# Patient Record
Sex: FEMALE
[De-identification: names, ages, dates, MRNs, and addresses within clinical notes are randomized per-mention and may not be internally consistent; named-entity substitution may affect disease eponyms.]

---

## 2022-06-11 ENCOUNTER — NON-APPOINTMENT (OUTPATIENT)
Age: 57
End: 2022-06-11

## 2023-08-11 ENCOUNTER — INPATIENT (INPATIENT)
Facility: HOSPITAL | Age: 58
LOS: 12 days | Discharge: HOME | DRG: 885 | End: 2023-08-24
Attending: PSYCHIATRY & NEUROLOGY | Admitting: PSYCHIATRY & NEUROLOGY
Payer: COMMERCIAL

## 2023-08-11 VITALS
RESPIRATION RATE: 16 BRPM | SYSTOLIC BLOOD PRESSURE: 125 MMHG | TEMPERATURE: 98 F | HEIGHT: 68 IN | OXYGEN SATURATION: 97 % | HEART RATE: 107 BPM | WEIGHT: 125 LBS | DIASTOLIC BLOOD PRESSURE: 89 MMHG

## 2023-08-11 DIAGNOSIS — F29 UNSPECIFIED PSYCHOSIS NOT DUE TO A SUBSTANCE OR KNOWN PHYSIOLOGICAL CONDITION: ICD-10-CM

## 2023-08-11 LAB
ANION GAP SERPL CALC-SCNC: 12 MMOL/L — SIGNIFICANT CHANGE UP (ref 5–17)
APAP SERPL-MCNC: < 2 UG/ML (ref 10–30)
APPEARANCE UR: CLEAR — SIGNIFICANT CHANGE UP
BASOPHILS # BLD AUTO: 0.02 K/UL — SIGNIFICANT CHANGE UP (ref 0–0.2)
BASOPHILS NFR BLD AUTO: 0.2 % — SIGNIFICANT CHANGE UP (ref 0–2)
BILIRUB UR-MCNC: NEGATIVE — SIGNIFICANT CHANGE UP
BUN SERPL-MCNC: 15 MG/DL — SIGNIFICANT CHANGE UP (ref 7–23)
CALCIUM SERPL-MCNC: 9.7 MG/DL — SIGNIFICANT CHANGE UP (ref 8.5–10.1)
CHLORIDE SERPL-SCNC: 107 MMOL/L — SIGNIFICANT CHANGE UP (ref 96–108)
CO2 SERPL-SCNC: 20 MMOL/L — LOW (ref 22–31)
COLOR SPEC: YELLOW — SIGNIFICANT CHANGE UP
CREAT SERPL-MCNC: 0.9 MG/DL — SIGNIFICANT CHANGE UP (ref 0.5–1.3)
DIFF PNL FLD: ABNORMAL
EGFR: 73 ML/MIN/1.73M2 — SIGNIFICANT CHANGE UP
EOSINOPHIL # BLD AUTO: 0 K/UL — SIGNIFICANT CHANGE UP (ref 0–0.5)
EOSINOPHIL NFR BLD AUTO: 0 % — SIGNIFICANT CHANGE UP (ref 0–6)
ETHANOL SERPL-MCNC: <10 MG/DL — SIGNIFICANT CHANGE UP (ref 0–10)
GLUCOSE SERPL-MCNC: 184 MG/DL — HIGH (ref 70–99)
GLUCOSE UR QL: 1000 MG/DL
HCT VFR BLD CALC: 43.5 % — SIGNIFICANT CHANGE UP (ref 34.5–45)
HGB BLD-MCNC: 15 G/DL — SIGNIFICANT CHANGE UP (ref 11.5–15.5)
IMM GRANULOCYTES NFR BLD AUTO: 0.3 % — SIGNIFICANT CHANGE UP (ref 0–0.9)
KETONES UR-MCNC: ABNORMAL
LEUKOCYTE ESTERASE UR-ACNC: NEGATIVE — SIGNIFICANT CHANGE UP
LYMPHOCYTES # BLD AUTO: 0.75 K/UL — LOW (ref 1–3.3)
LYMPHOCYTES # BLD AUTO: 8.5 % — LOW (ref 13–44)
MCHC RBC-ENTMCNC: 28.7 PG — SIGNIFICANT CHANGE UP (ref 27–34)
MCHC RBC-ENTMCNC: 34.5 GM/DL — SIGNIFICANT CHANGE UP (ref 32–36)
MCV RBC AUTO: 83.3 FL — SIGNIFICANT CHANGE UP (ref 80–100)
MONOCYTES # BLD AUTO: 0.37 K/UL — SIGNIFICANT CHANGE UP (ref 0–0.9)
MONOCYTES NFR BLD AUTO: 4.2 % — SIGNIFICANT CHANGE UP (ref 2–14)
NEUTROPHILS # BLD AUTO: 7.69 K/UL — HIGH (ref 1.8–7.4)
NEUTROPHILS NFR BLD AUTO: 86.8 % — HIGH (ref 43–77)
NITRITE UR-MCNC: NEGATIVE — SIGNIFICANT CHANGE UP
PCP SPEC-MCNC: SIGNIFICANT CHANGE UP
PH UR: 5 — SIGNIFICANT CHANGE UP (ref 5–8)
PLATELET # BLD AUTO: 279 K/UL — SIGNIFICANT CHANGE UP (ref 150–400)
POTASSIUM SERPL-MCNC: 3.6 MMOL/L — SIGNIFICANT CHANGE UP (ref 3.5–5.3)
POTASSIUM SERPL-SCNC: 3.6 MMOL/L — SIGNIFICANT CHANGE UP (ref 3.5–5.3)
PROT UR-MCNC: 30 MG/DL
RBC # BLD: 5.22 M/UL — HIGH (ref 3.8–5.2)
RBC # FLD: 12.5 % — SIGNIFICANT CHANGE UP (ref 10.3–14.5)
SALICYLATES SERPL-MCNC: <1.7 MG/DL — LOW (ref 2.8–20)
SARS-COV-2 RNA SPEC QL NAA+PROBE: SIGNIFICANT CHANGE UP
SODIUM SERPL-SCNC: 139 MMOL/L — SIGNIFICANT CHANGE UP (ref 135–145)
SP GR SPEC: 1.02 — SIGNIFICANT CHANGE UP (ref 1.01–1.02)
UROBILINOGEN FLD QL: NEGATIVE — SIGNIFICANT CHANGE UP
WBC # BLD: 8.86 K/UL — SIGNIFICANT CHANGE UP (ref 3.8–10.5)
WBC # FLD AUTO: 8.86 K/UL — SIGNIFICANT CHANGE UP (ref 3.8–10.5)

## 2023-08-11 PROCEDURE — 93010 ELECTROCARDIOGRAM REPORT: CPT

## 2023-08-11 PROCEDURE — 70450 CT HEAD/BRAIN W/O DYE: CPT | Mod: MH

## 2023-08-11 PROCEDURE — 80061 LIPID PANEL: CPT

## 2023-08-11 PROCEDURE — 82962 GLUCOSE BLOOD TEST: CPT

## 2023-08-11 PROCEDURE — 84443 ASSAY THYROID STIM HORMONE: CPT

## 2023-08-11 PROCEDURE — 83036 HEMOGLOBIN GLYCOSYLATED A1C: CPT

## 2023-08-11 PROCEDURE — 80164 ASSAY DIPROPYLACETIC ACD TOT: CPT

## 2023-08-11 PROCEDURE — 86803 HEPATITIS C AB TEST: CPT

## 2023-08-11 PROCEDURE — 36415 COLL VENOUS BLD VENIPUNCTURE: CPT

## 2023-08-11 PROCEDURE — 80307 DRUG TEST PRSMV CHEM ANLYZR: CPT

## 2023-08-11 PROCEDURE — 81001 URINALYSIS AUTO W/SCOPE: CPT

## 2023-08-11 PROCEDURE — 85025 COMPLETE CBC W/AUTO DIFF WBC: CPT

## 2023-08-11 PROCEDURE — 70450 CT HEAD/BRAIN W/O DYE: CPT | Mod: 26,MH

## 2023-08-11 PROCEDURE — 99285 EMERGENCY DEPT VISIT HI MDM: CPT

## 2023-08-11 RX ORDER — METFORMIN HYDROCHLORIDE 850 MG/1
4 TABLET ORAL
Refills: 0 | DISCHARGE

## 2023-08-11 RX ORDER — DIPHENHYDRAMINE HCL 50 MG
50 CAPSULE ORAL EVERY 6 HOURS
Refills: 0 | Status: DISCONTINUED | OUTPATIENT
Start: 2023-08-11 | End: 2023-08-24

## 2023-08-11 RX ORDER — HALOPERIDOL DECANOATE 100 MG/ML
5 INJECTION INTRAMUSCULAR EVERY 6 HOURS
Refills: 0 | Status: DISCONTINUED | OUTPATIENT
Start: 2023-08-11 | End: 2023-08-24

## 2023-08-11 RX ORDER — EMPAGLIFLOZIN 10 MG/1
1 TABLET, FILM COATED ORAL
Refills: 0 | DISCHARGE

## 2023-08-11 RX ORDER — TRAZODONE HCL 50 MG
75 TABLET ORAL AT BEDTIME
Refills: 0 | Status: DISCONTINUED | OUTPATIENT
Start: 2023-08-11 | End: 2023-08-19

## 2023-08-11 RX ORDER — RISPERIDONE 4 MG/1
0.5 TABLET ORAL ONCE
Refills: 0 | Status: COMPLETED | OUTPATIENT
Start: 2023-08-11 | End: 2023-08-11

## 2023-08-11 RX ORDER — DIPHENHYDRAMINE HCL 50 MG
50 CAPSULE ORAL ONCE
Refills: 0 | Status: DISCONTINUED | OUTPATIENT
Start: 2023-08-11 | End: 2023-08-24

## 2023-08-11 RX ORDER — HALOPERIDOL DECANOATE 100 MG/ML
5 INJECTION INTRAMUSCULAR ONCE
Refills: 0 | Status: DISCONTINUED | OUTPATIENT
Start: 2023-08-11 | End: 2023-08-24

## 2023-08-11 RX ORDER — RISPERIDONE 4 MG/1
0.5 TABLET ORAL
Refills: 0 | Status: DISCONTINUED | OUTPATIENT
Start: 2023-08-11 | End: 2023-08-13

## 2023-08-11 RX ADMIN — RISPERIDONE 0.5 MILLIGRAM(S): 4 TABLET ORAL at 21:43

## 2023-08-11 RX ADMIN — RISPERIDONE 0.5 MILLIGRAM(S): 4 TABLET ORAL at 16:00

## 2023-08-11 RX ADMIN — Medication 75 MILLIGRAM(S): at 21:45

## 2023-08-11 RX ADMIN — Medication 2 MILLIGRAM(S): at 16:53

## 2023-08-11 NOTE — BH PATIENT PROFILE - FALL HARM RISK - UNIVERSAL INTERVENTIONS
Call bell, personal items and telephone in reach/Non-slip footwear when patient is out of bed/[place] to call system/Physically safe environment - no spills, clutter or unnecessary equipment/Purposeful Proactive Rounding/Room/bathroom lighting operational, light cord in reach

## 2023-08-11 NOTE — ED ADULT NURSE NOTE - CHIEF COMPLAINT QUOTE
Pt. is A&Ox3, BIBEMS s/p hanging. EMS reports pt was in car with a shoe string around her neck. When bystander saw her she went into the woods where PD found her. Had a manic episode in July. Pt. reports putting something around her neck, almost passed out. Pt states "I just wanted to end it." 1:1 initiated. Patient has a plan of going to the train station.   [place]   Trauma alert called at 1245pm. Redness noted around patient's neck. Denies CP, SOB. Taken for EKG and cardiac monitor.

## 2023-08-11 NOTE — ED ADULT NURSE NOTE - NSFALLUNIVINTERV_ED_ALL_ED
Bed/Stretcher in lowest position, wheels locked, appropriate side rails in place/Call bell, personal items and telephone in reach/Instruct patient to call for assistance before getting out of bed/chair/stretcher/Non-slip footwear applied when patient is off stretcher/[place] to call system/Physically safe environment - no spills, clutter or unnecessary equipment/Purposeful proactive rounding/Room/bathroom lighting operational, light cord in reach

## 2023-08-11 NOTE — ED BEHAVIORAL HEALTH ASSESSMENT NOTE - NSSUICPROTFACT_PSY_ALL_CORE
Responsibility to children, family, or others/Identifies reasons for living/Supportive social network of family or friends/Cultural, spiritual and/or moral attitudes against suicide/Positive therapeutic relationships/Hindu beliefs

## 2023-08-11 NOTE — ED ADULT TRIAGE NOTE - CHIEF COMPLAINT QUOTE
Pt. is A&Ox3, BIBEMS s/p hanging. EMS reports pt was in car with a shoe string around her neck. When bystander saw her she went into the woods where PD found her. Had a manic episode in July. Pt. reports putting something around her neck, almost passed out. Pt states "I just wanted to end it." 1:1 initiated. Patient has a plan of going to the train station.   [place]   Trauma alert called at 1245pm. Redness noted around patient's neck. Denies CP, SOB. Taken for EKG and cardiac monitor.

## 2023-08-11 NOTE — ED BEHAVIORAL HEALTH ASSESSMENT NOTE - DESCRIPTION
See HPI Vital Signs Last 24 Hrs  T(C): 36.8 (11 Aug 2023 12:41), Max: 36.8 (11 Aug 2023 12:41)  T(F): 98.2 (11 Aug 2023 12:41), Max: 98.2 (11 Aug 2023 12:41)  HR: 107 (11 Aug 2023 12:41) (107 - 107)  BP: 125/89 (11 Aug 2023 12:41) (125/89 - 125/89)  BP(mean): --  RR: 16 (11 Aug 2023 12:41) (16 - 16)  SpO2: 97% (11 Aug 2023 12:41) (97% - 97%)    Parameters below as of 11 Aug 2023 12:41  Patient On (Oxygen Delivery Method): room air see hospitalist note

## 2023-08-11 NOTE — ED BEHAVIORAL HEALTH ASSESSMENT NOTE - RISK ASSESSMENT
HIGH RISK    ACUTE RISK FACTORS: Suicide attempt, Depression    CHRONIC RISK FACTORS: Depression, Hx of inpatient hospitalizations    PROTECTIVE FACTORS: No violence history, medication compliance, no access to guns, no global insomnia, no substance abuse, supportive family, willingness to seek help, no suicidal ideation or homicidal ideation, hopefulness for future.

## 2023-08-11 NOTE — ED BEHAVIORAL HEALTH ASSESSMENT NOTE - DETAILS
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 Admit 9.39

## 2023-08-11 NOTE — ED BEHAVIORAL HEALTH ASSESSMENT NOTE - SUMMARY
Patient is a 58 year old female, single, never , no decedents, domiciled with mother in private home, was employed as a  at [employer] [place] by [employer] but recently resigned due to "gayla and decline in cognition." No EtOH use. No drug use. PPHx of Depression, 1 prior hospitalization at [facility] a few years ago for depression. Recently in treatment with Dr. Mckeon, on Lexapro and Ativan for depression and anxiety, although as per patient she says she is Bipolar, had a manic episode in July but is not able to describe details of those events. Denies familial Hx of any mental illness. BIBA after interrupted SA via trying to hang self. Psychiatry consulted for SA.    Patient presents with psychosis and s/p SA. Patient is unpredictable and at risk for impulsive behavior due to her thought disorder. Patient is acutely psychotic and unable to function and is an imminent risk to self as he is care for self due to the severity of symptoms. Patient therefore requires inpatient admission for safety and stabilization. Patient is a 58 year old female, single, never , no dependents, domiciled with mother in private home, was employed as a  at [employer] but recently resigned due to "gayla and decline in cognition." No EtOH use. No drug use. PPHx of Depression, 1 prior hospitalization at [facility] a few years ago for depression. Recently in treatment with Dr. Mckeon, on Lexapro and Ativan for depression and anxiety, although as per patient she says she is Bipolar, had a manic episode in July but is not able to describe details of those events. Denies familial Hx of any mental illness. BIBA after interrupted SA via trying to hang self. Psychiatry consulted for SA.    Patient presents with psychosis and s/p SA. Patient is unpredictable and at risk for impulsive behavior due to her thought disorder. Patient is acutely psychotic and unable to function and is an imminent risk to self as he is care for self due to the severity of symptoms. Patient therefore requires inpatient admission for safety and stabilization.

## 2023-08-11 NOTE — ED BEHAVIORAL HEALTH ASSESSMENT NOTE - PATIENT'S CHIEF COMPLAINT
"I am going to go to [facility], I will be in this hospital for the rest of my life and I will never be able to atone for attempting suicide."

## 2023-08-11 NOTE — ED BEHAVIORAL HEALTH ASSESSMENT NOTE - NSBHATTESTAPPBILLTIME_PSY_A_CORE
I attest my time as BARRY is greater than 50% of the total combined time spent on qualifying patient care activities. I have reviewed and verified the documentation.

## 2023-08-11 NOTE — ED BEHAVIORAL HEALTH ASSESSMENT NOTE - HPI (INCLUDE ILLNESS QUALITY, SEVERITY, DURATION, TIMING, CONTEXT, MODIFYING FACTORS, ASSOCIATED SIGNS AND SYMPTOMS)
Patient is a 58 year old female, single, never , no decedents, domiciled with mother in private home, was employed as a  at [employer] but recently resigned due to "gayla and decline in cognition." No EtOH use. No drug use. PPHx of Depression, 1 prior hospitalization at [facility] a few years ago for depression. Recently in treatment with Dr. Mckeon, on Lexapro and Ativan for depression and anxiety, although as per patient she says she is Bipolar, had a manic episode in July but is not able to describe details of those events. Denies familial Hx of any mental illness. BIBA after interrupted SA via trying to hang self. Psychiatry consulted for SA.    Patient presents highly anxious, perseverative, ruminative and with irrational thought process. She reports she is Bipolar but has never been diagnosed, but did all the research and that she was "manic" in July and had to quit her job, stating she couldn't sleep and she was engaging in risky behaviors such as driving fast without a seatbelt. She also reports she also had visual hallucinations of where she used to work at [employer] but could not elaborate further, denies auditory hallucinations or other visual hallucinations. She remains highly fixated that her family will "never forgive her" for attempting suicide because she is [employer] and that she will spend the rest of her life in a psychiatric hospital and never be able to atone for her sin of attempting suicide. Reports this morning she researched [facility] times and planned to jump in front of the 11:34 AM train but missed it, she then ran to a tree and tried to hang self with shoestrings, visible red ligature marks to neck. She reports she planned SA this morning because, "I had a traumatic memory from 34 years ago when I was emotionally and verbally abusive to my sister so I decided to end it." She would not disclose more details, denies she was physically or sexually abusive to her sister of anyone else, denies any hx of verbal/physical/sexual abuse to her.     Collateral from sister, Hortensia: Reports patient has not been acting herself for a few weeks, she brought her to [facility] on Tuesday night because she was "acting different" but was T&R. She reports her sister has been having poor sleep and not feeling herself fo the past few days. She has only been in treatment for a few weeks for depression and anxiety however reports her sister keeps stating she is Bipolar and having manic and psychotic episodes but does not know the exact context of her behaviors.    Collateral from Dr. Mckeon, Psychiatrist: Reports he recently started treating patient for depression and anxiety, last saw her yesterday 8/10/23 and patient presented "fine and was normal." Suggested to obtain additional collateral from family regarding patients change in behavior. Patient is a 58 year old female, single, never , no dependents, domiciled with mother in private home, was employed as a  at [employer] but recently resigned due to "gayla and decline in cognition." No EtOH use. No drug use. PPHx of Depression, 1 prior hospitalization at [facility] a few years ago for depression. Recently in treatment with Dr. Mckeon, on Lexapro and Ativan for depression and anxiety, although as per patient she says she is Bipolar, had a manic episode in July but is not able to describe details of those events. Denies familial Hx of any mental illness. BIBA after interrupted SA via trying to hang self. Psychiatry consulted for SA.    Patient presents highly anxious, perseverative, ruminative and with irrational thought process. She reports she is Bipolar but has never been diagnosed, but did all the research and that she was "manic" in July and had to quit her job, stating she couldn't sleep and she was engaging in risky behaviors such as driving fast without a seatbelt. She also reports she also had visual hallucinations of where she used to work at [employer] but could not elaborate further, denies auditory hallucinations or other visual hallucinations. She remains highly fixated that her family will "never forgive her" for attempting suicide because she is [employer] and that she will spend the rest of her life in a psychiatric hospital and never be able to atone for her sin of attempting suicide. Reports this morning she researched [facility] times and planned to jump in front of the 11:34 AM train but missed it, she then ran to a tree and tried to hang self with shoestrings, visible red ligature marks to neck. She reports she planned SA this morning because, "I had a traumatic memory from 34 years ago when I was emotionally and verbally abusive to my sister so I decided to end it." She would not disclose more details, denies she was physically or sexually abusive to her sister of anyone else, denies any hx of verbal/physical/sexual abuse to her.     Collateral from sister, Hortensia: Reports patient has not been acting herself for a few weeks, she brought her to [facility] on Tuesday night because she was "acting different" but was T&R. She reports her sister has been having poor sleep and not feeling herself fo the past few days. She has only been in treatment for a few weeks for depression and anxiety however reports her sister keeps stating she is Bipolar and having manic and psychotic episodes but does not know the exact context of her behaviors.    Collateral from Dr. Mckeon, Psychiatrist: Reports he recently started treating patient for depression and anxiety, last saw her yesterday 8/10/23 and patient presented "fine and was normal." Suggested to obtain additional collateral from family regarding patients change in behavior.

## 2023-08-11 NOTE — ED ADULT NURSE NOTE - OBJECTIVE STATEMENT
Patient s/p attempted hanging. Has ligature marks around neck. Patient feels like she has no support from family and that they won't even visit her. Patient states that she is going to hell because she tried to kill herself. Says she has history of bipolar. Has never tried to hurt herself like this before. Patient denies SOB, CP. Has some neck pain. able to speak in full sentences, but speech is quiet. Patient denies wanting to hurt anyone else or hallucinations.

## 2023-08-11 NOTE — ED PROVIDER NOTE - OBJECTIVE STATEMENT
57 y/o female with PMHx of T2DM on metformin BIBEMS to the ED s/p suicidal attempt. Per EMS, pt was in her car and tightly wounding a shoestring around her neck when she was seen by a bystander, then ran into the woods where she was found by PD. Pt states she was attempting to end her life, that due to issues with her family she does not want to be here anymore. Pt with no diagnosed psychiatric conditions, however states she has been having "psychotic" episodes recently and a manic episode in July. Pt endorses red marks circumferentially to her neck, denies any tenderness or difficulty breathing. Denies hallucinations.

## 2023-08-11 NOTE — ED PROVIDER NOTE - CLINICAL SUMMARY MEDICAL DECISION MAKING FREE TEXT BOX
Patient presents to the ED for suicide attempt.  Attempted to strangle herself with shoelace.  Patient with marks on her neck from shoelace.  Talking well, no increased secretions, no hoarse voice.  Airway intact.  Psychiatry consulted and requires psychiatric admission.  Patient medically cleared although psych requesting CT scan of the head prior to admission.  Patient signed out to oncoming provider pending CT of head.

## 2023-08-11 NOTE — ED BEHAVIORAL HEALTH ASSESSMENT NOTE - OTHER
Was a  at [employer], but resigned because she reported "cognitive decline" Dr. Em

## 2023-08-11 NOTE — BH PATIENT PROFILE - HOME MEDICATIONS
atorvastatin 10 mg oral tablet , 1 tab(s) orally once a day  metFORMIN 500 mg oral tablet, extended release , 4 tab(s) orally once a day  Jardiance 25 mg oral tablet , 1 tab(s) orally once a day  LORazepam 1 mg oral tablet , 1 tab(s) orally 2 times a day as needed for  escitalopram 5 mg oral tablet , 1.5 tab(s) orally once a day

## 2023-08-12 DIAGNOSIS — F34.1 DYSTHYMIC DISORDER: ICD-10-CM

## 2023-08-12 LAB
A1C WITH ESTIMATED AVERAGE GLUCOSE RESULT: 6.7 % — HIGH (ref 4–5.6)
CHOLEST SERPL-MCNC: 137 MG/DL — SIGNIFICANT CHANGE UP
ESTIMATED AVERAGE GLUCOSE: 146 MG/DL — HIGH (ref 68–114)
GLUCOSE BLDC GLUCOMTR-MCNC: 196 MG/DL — HIGH (ref 70–99)
HDLC SERPL-MCNC: 69 MG/DL — SIGNIFICANT CHANGE UP
LIPID PNL WITH DIRECT LDL SERPL: 50 MG/DL — SIGNIFICANT CHANGE UP
NON HDL CHOLESTEROL: 68 MG/DL — SIGNIFICANT CHANGE UP
TRIGL SERPL-MCNC: 98 MG/DL — SIGNIFICANT CHANGE UP
TSH SERPL-MCNC: 0.82 UU/ML — SIGNIFICANT CHANGE UP (ref 0.34–4.82)

## 2023-08-12 PROCEDURE — 99223 1ST HOSP IP/OBS HIGH 75: CPT

## 2023-08-12 PROCEDURE — 99253 IP/OBS CNSLTJ NEW/EST LOW 45: CPT

## 2023-08-12 RX ORDER — ATORVASTATIN CALCIUM 80 MG/1
10 TABLET, FILM COATED ORAL AT BEDTIME
Refills: 0 | Status: DISCONTINUED | OUTPATIENT
Start: 2023-08-12 | End: 2023-08-24

## 2023-08-12 RX ORDER — METFORMIN HYDROCHLORIDE 850 MG/1
1000 TABLET ORAL
Refills: 0 | Status: DISCONTINUED | OUTPATIENT
Start: 2023-08-12 | End: 2023-08-24

## 2023-08-12 RX ADMIN — METFORMIN HYDROCHLORIDE 1000 MILLIGRAM(S): 850 TABLET ORAL at 21:49

## 2023-08-12 RX ADMIN — RISPERIDONE 0.5 MILLIGRAM(S): 4 TABLET ORAL at 09:11

## 2023-08-12 RX ADMIN — ATORVASTATIN CALCIUM 10 MILLIGRAM(S): 80 TABLET, FILM COATED ORAL at 21:49

## 2023-08-12 RX ADMIN — RISPERIDONE 0.5 MILLIGRAM(S): 4 TABLET ORAL at 21:49

## 2023-08-12 RX ADMIN — Medication 75 MILLIGRAM(S): at 22:26

## 2023-08-12 NOTE — BH INPATIENT PSYCHIATRY ASSESSMENT NOTE - RISK ASSESSMENT
HIGH RISK    ACUTE RISK FACTORS: Suicide attempt, Depression    CHRONIC RISK FACTORS: Depression, Hx of inpatient hospitalizations    PROTECTIVE FACTORS: No violence history, medication compliance, no access to guns, no global insomnia, no substance abuse, supportive family, willingness to seek help, no suicidal ideation or homicidal ideation, hopefulness for future. moderate risk as the pt is remorseful     ACUTE RISK FACTORS: Suicide attempt, Depression    CHRONIC RISK FACTORS: Depression, Hx of inpatient hospitalizations    PROTECTIVE FACTORS: No violence history, medication compliance, no access to guns, no global insomnia, no substance abuse, supportive family, willingness to seek help, no suicidal ideation or homicidal ideation, hopefulness for future.  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  Mitigating factors- the pt is currently in the safe setting of an inpatient hospital unit and pt is amenable  to ongoing treatment of her severe mood disorder with thought disorganization , pt amenable to ongoing outpatient follow up treatment, pt able to form therapeutic alliances with select staff

## 2023-08-12 NOTE — BH INPATIENT PSYCHIATRY ASSESSMENT NOTE - NSTXSUICIDINTERMD_PSY_ALL_CORE
1. Informed consent obtained from the pt for a trial of Risperdal now titrated to 2 mg po qhs to decrease daytime sedation  2.Ativan 1 mg po tid standing dose to alleviate severe and functionally impairing anxiety in the context of severe affective psychosis  3.Ongoing staff and pt family support and encouragement  4.Pt gave writer consent to speak with pt's family and with pt's outpatient Dr Mckeon in [place] to gather further pt clinical history to aid in pt treatment and disposition planning  5.Safety planning ongoing  6. To consider restart of SSRI Lexapro once the pt's anxiety and thought disorganization have begun to resolve

## 2023-08-12 NOTE — BH SOCIAL WORK INITIAL PSYCHOSOCIAL EVALUATION - NSBHEMPLOYEDYN_PSY_ALL_CORE
Per EMR, "was employed as a  at [employer] but recently resigned due to "gayla and decline in cognition."/No Per EMR, "was employed as a  at [employer] but recently resigned due to "gayla and decline in cognition."  Pt confirms that she submitted her resignation letter prior to admission due to her "gayla" and "knowing that she would not be able to continue to work with her current symptoms"./No

## 2023-08-12 NOTE — BH SOCIAL WORK INITIAL PSYCHOSOCIAL EVALUATION - NSBHSUPPORTCOMMENT_PSY_ALL_CORE
Pt gives consent for SW to contact her sister Hortensia @ [phone] but asked for SW to not call today 8/12 due to sister being busy

## 2023-08-12 NOTE — H&P ADULT - HISTORY OF PRESENT ILLNESS
pt is 57 yo female with PMH of type 2 DM, HL, depression, anxiety, bipolar here after having a suicide attempt. pt states she recently resigned from being  due to manic episodes.  as per chart pt was found with shoelace around neck trying to harm herself and brought to the ED.  pt currently denies any cp, sob, neck pain, dizziness, n/v/d.    PMH - as above  PSH - none  FH - mother with dementia in her 80s, father  in  his 70s of lung CA  SH - denies tob, used to drink but denies now, drugs.  lives with mother.  allergies - none

## 2023-08-12 NOTE — BH SOCIAL WORK INITIAL PSYCHOSOCIAL EVALUATION - OTHER PAST PSYCHIATRIC HISTORY (INCLUDE DETAILS REGARDING ONSET, COURSE OF ILLNESS, INPATIENT/OUTPATIENT TREATMENT)
Per EMR, "PPHx of Depression, 1 prior hospitalization at [facility] a few years ago for depression".  Per EMR, "PPHx of Depression, 1 prior hospitalization at [facility] a few years ago for depression". Pt discussed a long hx of depression, since her teens. Pt discussed that in her 50s, her depression had been worse due to a failed adoption attempt.

## 2023-08-12 NOTE — BH INPATIENT PSYCHIATRY ASSESSMENT NOTE - NSBHINFOSOURCE_PSY_ALL_CORE
Addended by: RYAN CAMPOS on: 2/1/2017 08:34 AM     Modules accepted: Orders    
Patient/Collateral...

## 2023-08-12 NOTE — H&P ADULT - NSHPLABSRESULTS_GEN_ALL_CORE
15.0   8.86  )-----------( 279      ( 11 Aug 2023 12:57 )             43.5     08-    139  |  107  |  15  ----------------------------<  184<H>  3.6   |  20<L>  |  0.90    Ca    9.7      11 Aug 2023 12:57              Urinalysis Basic - ( 11 Aug 2023 15:38 )    Color: Yellow / Appearance: Clear / S.025 / pH: x  Gluc: x / Ketone: Moderate  / Bili: Negative / Urobili: Negative   Blood: x / Protein: 30 mg/dL / Nitrite: Negative   Leuk Esterase: Negative / RBC: 0-2 /HPF / WBC 0-2 /HPF   Sq Epi: x / Non Sq Epi: x / Bacteria: Negative          < from: CT Head No Cont (23 @ 15:53) >    IMPRESSION:    No acute intracranial findings.    --- End of Report ---      < end of copied text >

## 2023-08-12 NOTE — BH INPATIENT PSYCHIATRY ASSESSMENT NOTE - NSTXDISORGINTERMD_PSY_ALL_CORE
1. Informed consent obtained from the pt for a trial of Risperdal now titrated to 2 mg po qhs to decrease daytime sedation  2.Ativan 1 mg po tid standing dose to alleviate severe and functionally impairing anxiety in the context of severe affective psychosis  3.Ongoing staff and pt family support and encouragement  4.Pt gave writer consent to speak with pt's family and with pt's outpatient Dr Mckeon in [place] to gather further pt clinical history to aid in pt treatment and disposition planning  5.Safety planning ongoing

## 2023-08-12 NOTE — BH INPATIENT PSYCHIATRY ASSESSMENT NOTE - NSBHATTESTBILLING_PSY_A_CORE
[id number] diagnostic evaluation with medical services [id number]-Gjwoaphtkn OBS or IP - moderate complexity OR 35-49 mins

## 2023-08-12 NOTE — BH INPATIENT PSYCHIATRY ASSESSMENT NOTE - CURRENT MEDICATION
MEDICATIONS  (STANDING):  risperiDONE   Tablet 0.5 milliGRAM(s) Oral two times a day    MEDICATIONS  (PRN):  diphenhydrAMINE 50 milliGRAM(s) Oral every 6 hours PRN Extrapyramidal prophylaxis  diphenhydrAMINE Injectable 50 milliGRAM(s) IntraMuscular once PRN Extrapyramidal prophylaxis  haloperidol     Tablet 5 milliGRAM(s) Oral every 6 hours PRN moderate psychotic agitation  haloperidol    Injectable 5 milliGRAM(s) IntraMuscular Once PRN severe psychotic agitation  LORazepam     Tablet 2 milliGRAM(s) Oral every 6 hours PRN moderate psychotic anxiety  LORazepam   Injectable 2 milliGRAM(s) IntraMuscular Once PRN severe psychotic anxiety  traZODone 75 milliGRAM(s) Oral at bedtime PRN insomnia   MEDICATIONS  (STANDING):  atorvastatin 10 milliGRAM(s) Oral at bedtime  metFORMIN 1000 milliGRAM(s) Oral two times a day  risperiDONE   Tablet 0.5 milliGRAM(s) Oral two times a day    MEDICATIONS  (PRN):  diphenhydrAMINE 50 milliGRAM(s) Oral every 6 hours PRN Extrapyramidal prophylaxis  diphenhydrAMINE Injectable 50 milliGRAM(s) IntraMuscular once PRN Extrapyramidal prophylaxis  haloperidol     Tablet 5 milliGRAM(s) Oral every 6 hours PRN moderate psychotic agitation  haloperidol    Injectable 5 milliGRAM(s) IntraMuscular Once PRN severe psychotic agitation  LORazepam     Tablet 2 milliGRAM(s) Oral every 6 hours PRN moderate psychotic anxiety  LORazepam   Injectable 2 milliGRAM(s) IntraMuscular Once PRN severe psychotic anxiety  traZODone 75 milliGRAM(s) Oral at bedtime PRN insomnia   MEDICATIONS  (STANDING):  atorvastatin 10 milliGRAM(s) Oral at bedtime  LORazepam     Tablet 1 milliGRAM(s) Oral three times a day  metFORMIN 1000 milliGRAM(s) Oral two times a day  risperiDONE   Tablet 2 milliGRAM(s) Oral at bedtime    MEDICATIONS  (PRN):  diphenhydrAMINE 50 milliGRAM(s) Oral every 6 hours PRN Extrapyramidal prophylaxis  diphenhydrAMINE Injectable 50 milliGRAM(s) IntraMuscular once PRN Extrapyramidal prophylaxis  haloperidol     Tablet 5 milliGRAM(s) Oral every 6 hours PRN moderate psychotic agitation  haloperidol    Injectable 5 milliGRAM(s) IntraMuscular Once PRN severe psychotic agitation  LORazepam     Tablet 2 milliGRAM(s) Oral every 6 hours PRN moderate psychotic anxiety  LORazepam   Injectable 2 milliGRAM(s) IntraMuscular Once PRN severe psychotic anxiety  traZODone 75 milliGRAM(s) Oral at bedtime PRN insomnia

## 2023-08-12 NOTE — BH INPATIENT PSYCHIATRY ASSESSMENT NOTE - HPI (INCLUDE ILLNESS QUALITY, SEVERITY, DURATION, TIMING, CONTEXT, MODIFYING FACTORS, ASSOCIATED SIGNS AND SYMPTOMS)
Patient is a 58 year old female, single, never , no dependents, domiciled with mother in private home, was employed as a  at [employer], [place] but recently resigned due to "gayla and decline in cognition." No EtOH use. No drug use. PPHx of Depression, 1 prior hospitalization at [facility] a few years ago for depression. Recently in treatment with Dr. Mckeon, on Lexapro and Ativan for depression and anxiety, although as per patient she says she is Bipolar, had a manic episode in July but is not able to describe details of those events. Denies familial Hx of any mental illness. BIBA after interrupted SA via trying to hang self. Psychiatry consulted for SA.    Patient presents highly anxious, perseverative, ruminative and with irrational thought process. She reports she is Bipolar but has never been diagnosed, but did all the research and that she was "manic" in July and had to quit her job, stating she couldn't sleep and she was engaging in risky behaviors such as driving fast without a seatbelt. She also reports she also had visual hallucinations of where she used to work at [employer], [place] but could not elaborate further, denies auditory hallucinations or other visual hallucinations. She remains highly fixated that her family will "never forgive her" for attempting suicide because she is [place] Jain and that she will spend the rest of her life in a psychiatric hospital and never be able to atone for her sin of attempting suicide. Reports this morning she researched [facility] times and planned to jump in front of the 11:34 AM train but missed it, she then ran to a tree and tried to hang self with shoestrings, visible red ligature marks to neck. She reports she planned SA this morning because, "I had a traumatic memory from 34 years ago when I was emotionally and verbally abusive to my sister so I decided to end it." She would not disclose more details, denies she was physically or sexually abusive to her sister of anyone else, denies any hx of verbal/physical/sexual abuse to her.     Collateral from sister, Hortensia: Reports patient has not been acting herself for a few weeks, she brought her to [facility] on Tuesday night because she was "acting different" but was T&R. She reports her sister has been having poor sleep and not feeling herself fo the past few days. She has only been in treatment for a few weeks for depression and anxiety however reports her sister keeps stating she is Bipolar and having manic and psychotic episodes but does not know the exact context of her behaviors.    Collateral from Dr. Mckeon, Psychiatrist: Reports he recently started treating patient for depression and anxiety, last saw her yesterday 8/10/23 and patient presented "fine and was normal." Suggested to obtain additional collateral from family regarding patients change in behavior.

## 2023-08-12 NOTE — BH INPATIENT PSYCHIATRY ASSESSMENT NOTE - NSBHMETABOLIC_PSY_ALL_CORE_FT
BMI: BMI (kg/m2): 19 (08-11-23 @ 17:25)  HbA1c:   Glucose:   BP: 125/87 (08-11-23 @ 14:36) (125/87 - 125/89)  Lipid Panel:  BMI: BMI (kg/m2): 19 (08-11-23 @ 17:25)  HbA1c: A1C with Estimated Average Glucose Result: 6.7 % (08-12-23 @ 08:54)    Glucose: POCT Blood Glucose.: 196 mg/dL (08-12-23 @ 21:31)    BP: 125/87 (08-11-23 @ 14:36) (125/87 - 125/89)  Lipid Panel: Date/Time: 08-12-23 @ 08:54  Cholesterol, Serum: 137  Direct LDL: --  HDL Cholesterol, Serum: 69  Total Cholesterol/HDL Ration Measurement: --  Triglycerides, Serum: 98   BMI: BMI (kg/m2): 19 (08-11-23 @ 17:25)  HbA1c: A1C with Estimated Average Glucose Result: 6.7 % (08-12-23 @ 08:54)    Glucose: POCT Blood Glucose.: 167 mg/dL (08-13-23 @ 21:36)    BP: --  Lipid Panel: Date/Time: 08-12-23 @ 08:54  Cholesterol, Serum: 137  Direct LDL: --  HDL Cholesterol, Serum: 69  Total Cholesterol/HDL Ration Measurement: --  Triglycerides, Serum: 98

## 2023-08-12 NOTE — BH INPATIENT PSYCHIATRY ASSESSMENT NOTE - DETAILS
Planned to jump in front of 11:34 am train but missed it, then ran to a tree and tried to hang self, SA interrupted but SCPD.

## 2023-08-12 NOTE — BH INPATIENT PSYCHIATRY ASSESSMENT NOTE - NSTXDCOTHRGOAL_PSY_ALL_CORE
Patient will be referred to the appropriate level of outpatient treatment and have appointments within 3-5 days of discharge.  No substance abuse issues  Benefits in place   Safe home to return to at time of dc

## 2023-08-12 NOTE — BH INPATIENT PSYCHIATRY ASSESSMENT NOTE - NSBHASSESSSUMMFT_PSY_ALL_CORE
covering note : Pt seen today and she was anxious to talk about her belief that she has bipolar disorder. Claims she " remembered something I repressed for the last 34 yrs. I only started speaking to her for the last 4 years ,  I that I wasn't speaking to her because I was jealous that she was prettier than I and had met a man and was getting  . I was stunned that I suddenly remembered this and I felt I needed to go to [facility], light a candle, and I told her, and we had several talks and she forgave me. "    She went  covering note : Pt seen today and she was anxious to talk about her belief that she has bipolar disorder. Claims she " remembered something I repressed for the last 34 yrs. I only started speaking to her for the last 4 years ,  I that I wasn't speaking to her because I was jealous that she was prettier than I and had met a man and was getting  . I was stunned that I suddenly remembered this and I felt I needed to go to [facility], light a candle, and I told her, and we had several talks and she forgave me. 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 ?

## 2023-08-12 NOTE — BH SOCIAL WORK INITIAL PSYCHOSOCIAL EVALUATION - NSBHPROFILEREVIEW_PSY_ALL_CORE
Per ED psych eval, "Patient presents highly anxious, perseverative, ruminative and with irrational thought process. She reports she is Bipolar but has never been diagnosed, but did all the research and that she was "manic" in July and had to quit her job, stating she couldn't sleep and she was engaging in risky behaviors such as driving fast without a seatbelt. She also reports she also had visual hallucinations of where she used to work at [employer] but could not elaborate further, denies auditory hallucinations or other visual hallucinations. She remains highly fixated that her family will "never forgive her" for attempting suicide because she is Amharic Lutheran and that she will spend the rest of her life in a psychiatric hospital and never be able to atone for her sin of attempting suicide. Reports this morning she researched [facility] times and planned to jump in front of the 11:34 AM train but missed it, she then ran to a tree and tried to hang self with shoestrings, visible red ligature marks to neck. She reports she planned SA this morning because, "I had a traumatic memory from 34 years ago when I was emotionally and verbally abusive to my sister so I decided to end it." She would not disclose more details, denies she was physically or sexually abusive to her sister of anyone else, denies any hx of verbal/physical/sexual abuse to her"./Yes

## 2023-08-12 NOTE — BH INPATIENT PSYCHIATRY ASSESSMENT NOTE - PATIENT'S CHIEF COMPLAINT
"I am going to go to [facility], I will be in this hospital for the rest of my life and I will never be able to atone for attempting suicide."

## 2023-08-12 NOTE — BH SOCIAL WORK INITIAL PSYCHOSOCIAL EVALUATION - NSHIGHRISKBEHFT_PSY_ALL_CORE
- Pt had plans to jump in front of a train but due to missing the train, attempted to hang herself to die by suicide.

## 2023-08-12 NOTE — H&P ADULT - ASSESSMENT
pt is 59 yo female with pMH as per HPI here with suicide attempt    # suicide attempt/depression  - plan as per psych  - no medical contraindication to staying on [address]    # Type 2 DM   - resume metformin  - patient is also on jardiance 25 mg daily which is non formulary and can be resumed upon discharge   - follow up a1c  - can f/u with dr kamran castillo at [facility]     # HL - continue atorvastatin    will sign off please recall prn

## 2023-08-12 NOTE — BH SOCIAL WORK INITIAL PSYCHOSOCIAL EVALUATION - NSBHHOUSECOMMENTFT_PSY_ALL_CORE
Per EMR, pt resides in a private residence with her Mother.  Per EMR, pt resides in a private residence with her Mother. Pt confirms

## 2023-08-12 NOTE — BH SOCIAL WORK INITIAL PSYCHOSOCIAL EVALUATION - NSPTSTATEDGOAL_PSY_ALL_CORE
Pt reports that she wants to return to Dr. Mckeon and wants to work on medication adjustments while on unit

## 2023-08-12 NOTE — BH SOCIAL WORK INITIAL PSYCHOSOCIAL EVALUATION - NSPROGENSOURCEINFO_PSY_ALL_CORE
Patient/Other(s) SW met with pt in dayroom. Pt was A&Ox4, rapid speech, perseverative, ruminative and focused on her recent experiences with psychotic and manic episodes./Patient/Other(s)

## 2023-08-12 NOTE — H&P ADULT - NSHPPHYSICALEXAM_GEN_ALL_CORE
Vital Signs Last 24 Hrs  T(C): 36.9 (12 Aug 2023 06:56), Max: 36.9 (12 Aug 2023 06:56)  T(F): 98.4 (12 Aug 2023 06:56), Max: 98.4 (12 Aug 2023 06:56)  HR: 82 (11 Aug 2023 14:36) (82 - 82)  BP: 125/87 (11 Aug 2023 14:36) (125/87 - 125/87)  BP(mean): --  RR: 18 (12 Aug 2023 06:56) (16 - 18)  SpO2: 99% (12 Aug 2023 06:56) (98% - 99%)    Parameters below as of 12 Aug 2023 06:56  Patient On (Oxygen Delivery Method): room air    GEN: lying in bed, NAD  HEENT:   NC/AT, pupils equal and reactive, EOMI  CV:  +S1, +S2, RRR  RESP:   lungs clear to auscultation bilaterally, no wheeze, rales, rhonchi   GI:  abdomen soft, non-tender, non-distended, normoactive BS  NEURO:  AAOX3, no focal neurological deficits  SKIN:  no rashes

## 2023-08-12 NOTE — BH SOCIAL WORK INITIAL PSYCHOSOCIAL EVALUATION - NSBHTREATHXNAMEFT_PSY_ALL_CORE
Per EMR, "Recently in treatment with Dr. Mckeon, on Lexapro and Ativan for depression and anxiety, although as per patient she says she is Bipolar, had a manic episode in July but is not able to describe details of those events".

## 2023-08-12 NOTE — BH INPATIENT PSYCHIATRY ASSESSMENT NOTE - NSSUICPROTFACT_PSY_ALL_CORE
Responsibility to children, family, or others/Identifies reasons for living/Supportive social network of family or friends/Cultural, spiritual and/or moral attitudes against suicide/Positive therapeutic relationships/Scientologist beliefs

## 2023-08-13 LAB
GLUCOSE BLDC GLUCOMTR-MCNC: 167 MG/DL — HIGH (ref 70–99)
HCV AB S/CO SERPL IA: 0.07 S/CO — SIGNIFICANT CHANGE UP (ref 0–0.99)
HCV AB SERPL-IMP: SIGNIFICANT CHANGE UP

## 2023-08-13 PROCEDURE — 99232 SBSQ HOSP IP/OBS MODERATE 35: CPT

## 2023-08-13 RX ORDER — ESCITALOPRAM OXALATE 10 MG/1
10 TABLET, FILM COATED ORAL DAILY
Refills: 0 | Status: DISCONTINUED | OUTPATIENT
Start: 2023-08-13 | End: 2023-08-14

## 2023-08-13 RX ORDER — RISPERIDONE 4 MG/1
1 TABLET ORAL
Refills: 0 | Status: DISCONTINUED | OUTPATIENT
Start: 2023-08-13 | End: 2023-08-14

## 2023-08-13 RX ADMIN — Medication 2 MILLIGRAM(S): at 10:59

## 2023-08-13 RX ADMIN — Medication 2 MILLIGRAM(S): at 21:41

## 2023-08-13 RX ADMIN — ATORVASTATIN CALCIUM 10 MILLIGRAM(S): 80 TABLET, FILM COATED ORAL at 21:38

## 2023-08-13 RX ADMIN — HALOPERIDOL DECANOATE 5 MILLIGRAM(S): 100 INJECTION INTRAMUSCULAR at 10:59

## 2023-08-13 RX ADMIN — RISPERIDONE 0.5 MILLIGRAM(S): 4 TABLET ORAL at 10:59

## 2023-08-13 RX ADMIN — Medication 2 MILLIGRAM(S): at 01:01

## 2023-08-13 RX ADMIN — Medication 75 MILLIGRAM(S): at 21:38

## 2023-08-13 RX ADMIN — Medication 50 MILLIGRAM(S): at 21:39

## 2023-08-13 RX ADMIN — RISPERIDONE 1 MILLIGRAM(S): 4 TABLET ORAL at 21:40

## 2023-08-13 RX ADMIN — METFORMIN HYDROCHLORIDE 1000 MILLIGRAM(S): 850 TABLET ORAL at 10:59

## 2023-08-13 RX ADMIN — METFORMIN HYDROCHLORIDE 1000 MILLIGRAM(S): 850 TABLET ORAL at 21:38

## 2023-08-13 NOTE — BH INPATIENT PSYCHIATRY PROGRESS NOTE - NSBHCHARTREVIEWVS_PSY_A_CORE FT
Vital Signs Last 24 Hrs  T(C): 36.3 (08-13-23 @ 06:46), Max: 36.3 (08-13-23 @ 06:46)  T(F): 97.4 (08-13-23 @ 06:46), Max: 97.4 (08-13-23 @ 06:46)  HR: --  BP: --  BP(mean): --  RR: 17 (08-13-23 @ 06:46) (17 - 17)  SpO2: 100% (08-13-23 @ 06:46) (100% - 100%)    Orthostatic VS  08-13-23 @ 06:46  Lying BP: --/-- HR: --  Sitting BP: 120/83 HR: 100  Standing BP: 110/83 HR: 102  Site: upper right arm  Mode: electronic  Orthostatic VS  08-12-23 @ 06:56  Lying BP: --/-- HR: --  Sitting BP: 94/72 HR: 80  Standing BP: 99/77 HR: 91  Site: upper right arm  Mode: electronic  Orthostatic VS  08-11-23 @ 17:25  Lying BP: --/-- HR: --  Sitting BP: 124/68 HR: 98  Standing BP: 98/73 HR: 108  Site: --  Mode: electronic   Vital Signs Last 24 Hrs  T(C): 36.3 (08-13-23 @ 06:46), Max: 36.3 (08-13-23 @ 06:46)  T(F): 97.4 (08-13-23 @ 06:46), Max: 97.4 (08-13-23 @ 06:46)  HR: --  BP: --  BP(mean): --  RR: 17 (08-13-23 @ 06:46) (17 - 17)  SpO2: 100% (08-13-23 @ 06:46) (100% - 100%)    Orthostatic VS  08-13-23 @ 06:46  Lying BP: --/-- HR: --  Sitting BP: 120/83 HR: 100  Standing BP: 110/83 HR: 102  Site: upper right arm  Mode: electronic  Orthostatic VS  08-12-23 @ 06:56  Lying BP: --/-- HR: --  Sitting BP: 94/72 HR: 80  Standing BP: 99/77 HR: 91  Site: upper right arm  Mode: electronic

## 2023-08-14 PROCEDURE — 99232 SBSQ HOSP IP/OBS MODERATE 35: CPT

## 2023-08-14 RX ORDER — RISPERIDONE 4 MG/1
2 TABLET ORAL AT BEDTIME
Refills: 0 | Status: DISCONTINUED | OUTPATIENT
Start: 2023-08-14 | End: 2023-08-16

## 2023-08-14 RX ADMIN — METFORMIN HYDROCHLORIDE 1000 MILLIGRAM(S): 850 TABLET ORAL at 10:27

## 2023-08-14 RX ADMIN — Medication 0.5 MILLIGRAM(S): at 14:21

## 2023-08-14 RX ADMIN — RISPERIDONE 1 MILLIGRAM(S): 4 TABLET ORAL at 10:26

## 2023-08-14 RX ADMIN — METFORMIN HYDROCHLORIDE 1000 MILLIGRAM(S): 850 TABLET ORAL at 20:35

## 2023-08-14 RX ADMIN — Medication 1 MILLIGRAM(S): at 20:35

## 2023-08-14 RX ADMIN — Medication 75 MILLIGRAM(S): at 20:35

## 2023-08-14 RX ADMIN — RISPERIDONE 2 MILLIGRAM(S): 4 TABLET ORAL at 20:35

## 2023-08-14 RX ADMIN — ESCITALOPRAM OXALATE 10 MILLIGRAM(S): 10 TABLET, FILM COATED ORAL at 10:26

## 2023-08-14 RX ADMIN — ATORVASTATIN CALCIUM 10 MILLIGRAM(S): 80 TABLET, FILM COATED ORAL at 20:35

## 2023-08-14 NOTE — BH INPATIENT PSYCHIATRY PROGRESS NOTE - NSBHMETABOLIC_PSY_ALL_CORE_FT
BMI: BMI (kg/m2): 19 (08-11-23 @ 17:25)  HbA1c: A1C with Estimated Average Glucose Result: 6.7 % (08-12-23 @ 08:54)    Glucose: POCT Blood Glucose.: 167 mg/dL (08-13-23 @ 21:36)    BP: --  Lipid Panel: Date/Time: 08-12-23 @ 08:54  Cholesterol, Serum: 137  Direct LDL: --  HDL Cholesterol, Serum: 69  Total Cholesterol/HDL Ration Measurement: --  Triglycerides, Serum: 98

## 2023-08-14 NOTE — BH INPATIENT PSYCHIATRY PROGRESS NOTE - NSBHCHARTREVIEWVS_PSY_A_CORE FT
Vital Signs Last 24 Hrs  T(C): 36.3 (08-14-23 @ 07:41), Max: 36.3 (08-14-23 @ 07:41)  T(F): 97.4 (08-14-23 @ 07:41), Max: 97.4 (08-14-23 @ 07:41)  HR: --  BP: --  BP(mean): --  RR: 18 (08-14-23 @ 07:41) (18 - 18)  SpO2: 99% (08-14-23 @ 07:41) (99% - 99%)    Orthostatic VS  08-14-23 @ 07:41  Lying BP: --/-- HR: --  Sitting BP: 124/70 HR: 96  Standing BP: 115/67 HR: 101  Site: upper right arm  Mode: electronic  Orthostatic VS  08-13-23 @ 06:46  Lying BP: --/-- HR: --  Sitting BP: 120/83 HR: 100  Standing BP: 110/83 HR: 102  Site: upper right arm  Mode: electronic

## 2023-08-14 NOTE — BH INPATIENT PSYCHIATRY PROGRESS NOTE - NSBHFUPINTERVALHXFT_PSY_A_CORE
Pt seen in the day room alone and then later with her sister and her mother during family visiting time.  The pt remains massively anxious and easily overwhelmed and distraught in the context of her near constant cognitive "flooding" with notions of catastrophizing related to the nature and severity of her affective psychosis and the pt's ongoing though mistaken expectation of draconian measures to treat her illness. " I had a homicidal thought briefly the other day. Will that thought keep me here forever?" The pt was noted to be extremely thin and evidence of the pt's self inflicted shoe string ligature marks to her neck in a suicide gesture prior to her admission to hospital was apparent.     The pt presented as severely anxious, depressed and thought disordered with evidence of psychomotor agitation and a near permanent state of pt. panic mode with difficulty grasping even basic reassurances given to the pt by hospital staff and by her family. The pt appeared to be severely distraught and functionally impaired as a result of her current agitated depression with evidence of somatic and nihilistic delusional thinking.    The pt is tolerating her currently newly begun med regimen of Risperdal with  2 mg dose moved to hs due to pt reported c/o daytime sedation. Plan also discussed to add low dose standing Ativan 1 mg po tid and to temporarily DC SSRI Lexapro due to potential for exacerbation of the pt's already severe anxious agitation.  The pt currently presents as a low risk for suicide and a low risk for aggression or dangerous impulsivity towards others.

## 2023-08-15 DIAGNOSIS — F31.9 BIPOLAR DISORDER, UNSPECIFIED: ICD-10-CM

## 2023-08-15 PROCEDURE — 99232 SBSQ HOSP IP/OBS MODERATE 35: CPT

## 2023-08-15 RX ADMIN — Medication 1 MILLIGRAM(S): at 09:17

## 2023-08-15 RX ADMIN — Medication 1 MILLIGRAM(S): at 20:28

## 2023-08-15 RX ADMIN — ATORVASTATIN CALCIUM 10 MILLIGRAM(S): 80 TABLET, FILM COATED ORAL at 20:29

## 2023-08-15 RX ADMIN — RISPERIDONE 2 MILLIGRAM(S): 4 TABLET ORAL at 20:29

## 2023-08-15 RX ADMIN — METFORMIN HYDROCHLORIDE 1000 MILLIGRAM(S): 850 TABLET ORAL at 09:17

## 2023-08-15 RX ADMIN — Medication 1 MILLIGRAM(S): at 12:16

## 2023-08-15 RX ADMIN — METFORMIN HYDROCHLORIDE 1000 MILLIGRAM(S): 850 TABLET ORAL at 20:29

## 2023-08-15 RX ADMIN — Medication 75 MILLIGRAM(S): at 20:29

## 2023-08-15 NOTE — BH INPATIENT PSYCHIATRY PROGRESS NOTE - NSBHFUPINTERVALCCFT_PSY_A_CORE
" Have you seen what they say on the computer ? That patients can be held for 6 months against their will and then never be allowed to return home. I had one homicidal thought. After I took the new medicine the thought went away and never came back.  I promise I will go to outpatient therapy every day if you want me to if you can promise me that I won't have to stay in a psych hospital forever."    Nutrition consult requested on 8/15/23 due to pt DM 2 and due to pt poor nutrition with BMI = 19.  " Have you seen what they say on the computer ? That patients can be held for 6 months against their will and then never be allowed to return home. I had one homicidal thought. After I took the new medicine the thought went away and never came back.  I promise I will go to outpatient therapy every day if you want me to if you can promise me that I won't have to stay in a psych hospital forever."    Nutrition consult requested on 8/15/23 due to pt DM 2 and due to pt poor nutrition with BMI = 19.   On 8/14/23 this writer met with the pt and the pt's sister Hortensia and their mother during family visiting time on the unit . The pt's worsening affective lability since 7/2023 after years of seemingly unipolar episodes of depression was discussed with treatment options, medication changes and initial discussion of aftercare treatment planning.

## 2023-08-15 NOTE — BH INPATIENT PSYCHIATRY PROGRESS NOTE - NSBHCHARTREVIEWVS_PSY_A_CORE FT
Vital Signs Last 24 Hrs  T(C): 36.8 (08-15-23 @ 07:32), Max: 36.8 (08-15-23 @ 07:32)  T(F): 98.2 (08-15-23 @ 07:32), Max: 98.2 (08-15-23 @ 07:32)  HR: --  BP: --  BP(mean): --  RR: 18 (08-15-23 @ 07:32) (18 - 18)  SpO2: 99% (08-15-23 @ 07:32) (99% - 99%)    Orthostatic VS  08-15-23 @ 07:32  Lying BP: --/-- HR: --  Sitting BP: 117/79 HR: 87  Standing BP: 100/74 HR: 106  Site: upper right arm  Mode: electronic  Orthostatic VS  08-14-23 @ 07:41  Lying BP: --/-- HR: --  Sitting BP: 124/70 HR: 96  Standing BP: 115/67 HR: 101  Site: upper right arm  Mode: electronic

## 2023-08-15 NOTE — BH INPATIENT PSYCHIATRY PROGRESS NOTE - NSBHFUPINTERVALHXFT_PSY_A_CORE
Pt seen in the day room alone and then later with her sister and her mother during family visiting time.  The pt remains massively anxious and easily overwhelmed and distraught in the context of her near constant cognitive "flooding" with notions of catastrophizing related to the nature and severity of her affective psychosis and the pt's ongoing though mistaken expectation of draconian measures to treat her illness. " I had a homicidal thought briefly the other day. Will that thought keep me here forever?" The pt was noted to be extremely thin and evidence of the pt's self inflicted shoe string ligature marks to her neck in a suicide gesture prior to her admission to hospital was apparent.     The pt presented as severely anxious, depressed and thought disordered with evidence of psychomotor agitation and a near permanent state of pt. panic mode with difficulty grasping even basic reassurances given to the pt by hospital staff and by her family. The pt appeared to be severely distraught and functionally impaired as a result of her current agitated depression with evidence of somatic and nihilistic delusional thinking.    The pt is tolerating her currently newly begun med regimen of Risperdal with  2 mg dose moved to hs due to pt reported c/o daytime sedation. Plan also discussed to add low dose standing Ativan 1 mg po tid and to temporarily DC SSRI Lexapro due to potential for exacerbation of the pt's already severe anxious agitation.  The pt currently presents as a low risk for suicide and a low risk for aggression or dangerous impulsivity towards others.   Pt seen in the day room alone and then later with her sister and her mother during family visiting time.  The pt remains massively anxious and easily overwhelmed and distraught in the context of her near constant cognitive "flooding" with  " racing thoughts  and I was pacing at home in my room even after I took 3 sleeping pills ". The pt again asked, " I had a homicidal thought briefly the other day. Will that thought keep me here forever?" The pt was noted to be extremely thin and evidence of the pt's self inflicted shoe string ligature marks to her neck in a suicide gesture prior to her admission to hospital was apparent.     The pt presented as severely anxious, depressed and thought disordered with evidence of psychomotor agitation and a near permanent state of pt. panic mode with difficulty grasping even basic reassurances given to the pt by hospital staff and by her family. The pt appeared to be severely distraught and functionally impaired as a result of her current agitated depression with evidence of somatic and nihilistic delusional thinking.    The pt is tolerating her currently newly begun med regimen of Risperdal with  2 mg dose now moved to hs due to pt reported c/o daytime sedation. Plan also discussed to add low dose standing Ativan 1 mg po tid and to temporarily DC SSRI Lexapro due to potential for exacerbation of the pt's already severe anxious agitation.  The pt currently presents as a low risk for suicide and a low risk for aggression or dangerous impulsivity towards others.   Pt seen in the day room alone and then later with her sister and her mother during family visiting time.  The pt remains massively anxious and easily overwhelmed and distraught in the context of her near constant cognitive "flooding" with  " racing thoughts  and I was pacing at home in my room even after I took 3 sleeping pills ". The pt again asked, " I had a homicidal thought briefly the other day. Will that thought keep me here forever?" The pt was noted to be extremely thin and evidence of the pt's self inflicted shoe string ligature marks to her neck in a suicide gesture prior to her admission to hospital was apparent.     The pt presented as severely anxious, depressed and thought disordered with evidence of psychomotor agitation and a near permanent state of pt. panic mode with difficulty grasping even basic reassurances given to the pt by hospital staff and by her family. The pt appeared to be severely distraught and functionally impaired as a result of her current agitated depression with evidence of somatic and nihilistic delusional thinking.    The pt is tolerating her currently newly begun med regimen of Risperdal with  2 mg dose now moved to hs due to pt reported c/o daytime sedation. Plan also discussed to add low dose standing Ativan 1 mg po tid and to temporarily DC SSRI Lexapro due to potential for exacerbation of the pt's already severe anxious agitation.  The pt currently presents as a low risk for suicide and a low risk for aggression or dangerous impulsivity towards others. Rationale/Summary (include warning signs, risk factors (static vs modifiable), protective factors, access to lethal means, comment on LEVEL of risk for ALL dangerous behavior, etc.):	The pt currently presents as a low risk for suicide but she remains affectively labile and easily overwhelmed in the context of her severe mood lability and illogical thinking  Acute risk factors: pt s/p Suicide attempt, h/o recurrent functionally impairing Depression, impaired sleep , decreased appetite with weight loss 9 pt with DM 20  Chronic risk factors- : h/o recurrent Depression, Hx of inpatient hospitalizations, 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  Mitigating factors- the pt is currently in the safe setting of an inpatient hospital unit and pt is amenable  to ongoing treatment of her severe mood disorder with thought disorganization , pt amenable to ongoing outpatient follow up treatment, pt able to form therapeutic alliances with select staff  	Potential Violence Risk	Low

## 2023-08-16 PROCEDURE — 99232 SBSQ HOSP IP/OBS MODERATE 35: CPT

## 2023-08-16 RX ORDER — MULTIVIT-MIN/FERROUS GLUCONATE 9 MG/15 ML
1 LIQUID (ML) ORAL DAILY
Refills: 0 | Status: DISCONTINUED | OUTPATIENT
Start: 2023-08-16 | End: 2023-08-24

## 2023-08-16 RX ORDER — THIAMINE MONONITRATE (VIT B1) 100 MG
100 TABLET ORAL DAILY
Refills: 0 | Status: DISCONTINUED | OUTPATIENT
Start: 2023-08-16 | End: 2023-08-24

## 2023-08-16 RX ORDER — RISPERIDONE 4 MG/1
3 TABLET ORAL AT BEDTIME
Refills: 0 | Status: DISCONTINUED | OUTPATIENT
Start: 2023-08-16 | End: 2023-08-24

## 2023-08-16 RX ADMIN — Medication 50 MILLIGRAM(S): at 21:03

## 2023-08-16 RX ADMIN — Medication 1 MILLIGRAM(S): at 21:04

## 2023-08-16 RX ADMIN — Medication 1 MILLIGRAM(S): at 13:14

## 2023-08-16 RX ADMIN — HALOPERIDOL DECANOATE 5 MILLIGRAM(S): 100 INJECTION INTRAMUSCULAR at 21:04

## 2023-08-16 RX ADMIN — METFORMIN HYDROCHLORIDE 1000 MILLIGRAM(S): 850 TABLET ORAL at 21:03

## 2023-08-16 RX ADMIN — RISPERIDONE 3 MILLIGRAM(S): 4 TABLET ORAL at 21:04

## 2023-08-16 RX ADMIN — METFORMIN HYDROCHLORIDE 1000 MILLIGRAM(S): 850 TABLET ORAL at 12:19

## 2023-08-16 RX ADMIN — Medication 1 MILLIGRAM(S): at 09:12

## 2023-08-16 RX ADMIN — ATORVASTATIN CALCIUM 10 MILLIGRAM(S): 80 TABLET, FILM COATED ORAL at 21:04

## 2023-08-16 NOTE — DIETITIAN INITIAL EVALUATION ADULT - OTHER INFO
57 y/o female with PMH of type 2 DM, HL, depression, anxiety, bipolar here after having a suicide attempt. pt states she recently resigned from being  due to manic episodes. As per chart pt was found with shoelace around neck trying to harm herself and brought to the ED. Admitted to .    RD consulted for DM management and poor nutrition. Noted w/ continued poor appetite since admission. Currently on consistent CHO diet and states she dislikes the food at . POCT variable (167, 196), A1c 6.7% on 8/14; on metformin. UBW stated at 165# x 8 months ago however states she lost 30#/ 18.18% x 8 months (severe and clinically significant). Appeared very thin however RD was unable to conduct NFPE at this time due to current cognitive state. RD went over menu choice w/ pt and explained what she can/cannot order according to counting carbohydrates - pt receptive and appreciated education RD provided. Pt also wanting to trial Ensure Max BID to optimize nutritional needs (provides 150 kcal, 30 g protein/ shake). If PO intake does not improve during admission, would suggest to liberalize diet to regular in order to maximize caloric and nutrient intake. See additional recommendations below.

## 2023-08-16 NOTE — DIETITIAN INITIAL EVALUATION ADULT - MALNUTRITION
Pt meets criteria for severe protein-calorie malnutrition in context of social/environmental circumstances

## 2023-08-16 NOTE — DIETITIAN INITIAL EVALUATION ADULT - ORAL INTAKE PTA/DIET HISTORY
Lives at home w/ her mother and was able to cook and grocery shop w/o difficulties. Since DM dx 8 months ago and starting Metformin, appetite has decreased as per pt. Also states appetite was decreased d/t difficulties at home w/ her personal life. Watches carbohydrate intake and used to consume ensure at home.

## 2023-08-16 NOTE — DIETITIAN INITIAL EVALUATION ADULT - SIGNS/SYMPTOMS
AEB <75% ENN x >3 months, 18.18% wt loss x 8 months AEB <50% ENN x >1 month, 18.18% wt loss x 8 months

## 2023-08-16 NOTE — DIETITIAN INITIAL EVALUATION ADULT - FACTORS AFF FOOD INTAKE
change in mental status/persistent lack of appetite/[employer]/ethnic/cultural/personal food preferences

## 2023-08-16 NOTE — BH INPATIENT PSYCHIATRY PROGRESS NOTE - NSBHFUPINTERVALCCFT_PSY_A_CORE
Nutrition consult note appreciated  on 8/16/23 due to pt DM 2 and due to pt poor nutrition with BMI = 19. Pt diagnosed with severe protein-calorie malnutrition.    Plan for Ensure max BID . MVI with minerals, Thiamine 100 mg po q am   On 8/16/23 this writer met with the pt and the pt's sister Hortensia and their mother during family visiting time on the unit . 	Discussion with pt and her family as to likely pt diagnosis of a bipolar d/o with psychotic features and plans to titrate Risperdal and Ativan to alleviate more acute pt symptoms. This writer also offered the pt and her family psychoeducational information related to bipolar d/o and treatment options including Risperdal and a possible trial of a mood stabilizer such as Depakote.

## 2023-08-16 NOTE — BH INPATIENT PSYCHIATRY PROGRESS NOTE - NSBHFUPINTERVALHXFT_PSY_A_CORE
Pt seen in the day room alone and then later with her sister and her mother during family visiting time.  The pt remains massively anxious and easily overwhelmed and distraught in the context of her near racing thoughts and catastrophizing related to her discharge and after care treatment. The pt remains suspicious but apologetic when stating her skepticism as to writer's assurance to the pt that the plan is for pt to return home with ongoing outpatient treatment once she is more clinically stable. The pt again asked, " I haven't had another  homicidal thought since that one time the other day. Will that thought keep me here forever?"   The pt was noted to be extremely thin and evidence of the pt's self inflicted shoe string ligature marks to her neck in a suicide gesture prior to her admission to hospital was apparent.     The pt presented as severely anxious, depressed and thought disordered with evidence of psychomotor agitation and a near permanent state of pt. panic mode with difficulty grasping even basic reassurances given to the pt by hospital staff and by her family. The pt appeared to be severely distraught and functionally impaired as a result of her current agitated depression with evidence of somatic and nihilistic delusional thinking.    The pt is tolerating her currently newly begun med regimen of Risperdal with plan to titrate dose to  3 mg po q hs  dose . Plan also discussed to add low dose standing Ativan to be increased to  1 mg po  4 times a day and to temporarily DC SSRI Lexapro due to potential for exacerbation of the pt's already severe anxious agitation.  The pt currently presents as a low risk for suicide and a low risk for aggression or dangerous impulsivity towards others. Rationale/Summary (include warning signs, risk factors (static vs modifiable), protective factors, access to lethal means, comment on LEVEL of risk for ALL dangerous behavior, etc.):	The pt currently presents as a low risk for suicide but she remains affectively labile and easily overwhelmed in the context of her severe mood lability and illogical thinking  Acute risk factors: pt s/p Suicide attempt, h/o recurrent functionally impairing Depression, impaired sleep , decreased appetite with weight loss 9 pt with DM 20  Chronic risk factors- : h/o recurrent Depression, Hx of inpatient hospitalizations, 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  Mitigating factors- the pt is currently in the safe setting of an inpatient hospital unit and pt is amenable  to ongoing treatment of her severe mood disorder with thought disorganization , pt amenable to ongoing outpatient follow up treatment, pt able to form therapeutic alliances with select staff  	Potential Violence Risk	Low

## 2023-08-16 NOTE — BH INPATIENT PSYCHIATRY PROGRESS NOTE - NSBHCHARTREVIEWVS_PSY_A_CORE FT
Vital Signs Last 24 Hrs  T(C): 36.4 (08-16-23 @ 07:11), Max: 36.4 (08-16-23 @ 07:11)  T(F): 97.5 (08-16-23 @ 07:11), Max: 97.5 (08-16-23 @ 07:11)  HR: --  BP: --  BP(mean): --  RR: 18 (08-16-23 @ 07:11) (18 - 18)  SpO2: 100% (08-16-23 @ 07:11) (100% - 100%)    Orthostatic VS  08-16-23 @ 07:11  Lying BP: --/-- HR: --  Sitting BP: 142/83 HR: 100  Standing BP: 106/77 HR: 100  Site: upper right arm  Mode: electronic  Orthostatic VS  08-15-23 @ 07:32  Lying BP: --/-- HR: --  Sitting BP: 117/79 HR: 87  Standing BP: 100/74 HR: 106  Site: upper right arm  Mode: electronic

## 2023-08-16 NOTE — DIETITIAN INITIAL EVALUATION ADULT - ADD RECOMMEND
1) C/w consistent CHO however if PO intake does not improve during admission, suggest to liberalize diet to regular in order to maximize caloric and nutrient intake.  2) Encourage protein-rich foods, maximize food preferences   3) Will provide Ensure Max BID to optimize nutritional needs (provides 150 kcal, 30 g protein/ shake)    4) Monitor bowel movements, if no BM for >3 days, consider implementing bowel regimen.   5) MVI w/ minerals daily to ensure 100% RDA met   6) Consider adding thiamine 100 mg daily 2/2 poor PO intake/ malnutrition   7) Monitor and optimize BG levels between 140-180 mg/dL by medical management   8) Consider adding appetite stimulant such as Remeron or Marinol 2/2 chronically poor appetite/ PO intake   RD will continue to monitor PO intake, labs, hydration, and wt prn.

## 2023-08-17 PROCEDURE — 99232 SBSQ HOSP IP/OBS MODERATE 35: CPT

## 2023-08-17 RX ORDER — DIVALPROEX SODIUM 500 MG/1
250 TABLET, DELAYED RELEASE ORAL AT BEDTIME
Refills: 0 | Status: DISCONTINUED | OUTPATIENT
Start: 2023-08-17 | End: 2023-08-18

## 2023-08-17 RX ADMIN — Medication 75 MILLIGRAM(S): at 20:53

## 2023-08-17 RX ADMIN — DIVALPROEX SODIUM 250 MILLIGRAM(S): 500 TABLET, DELAYED RELEASE ORAL at 20:55

## 2023-08-17 RX ADMIN — METFORMIN HYDROCHLORIDE 1000 MILLIGRAM(S): 850 TABLET ORAL at 10:39

## 2023-08-17 RX ADMIN — RISPERIDONE 3 MILLIGRAM(S): 4 TABLET ORAL at 20:54

## 2023-08-17 RX ADMIN — Medication 1 MILLIGRAM(S): at 20:54

## 2023-08-17 RX ADMIN — METFORMIN HYDROCHLORIDE 1000 MILLIGRAM(S): 850 TABLET ORAL at 20:53

## 2023-08-17 RX ADMIN — Medication 1 TABLET(S): at 10:20

## 2023-08-17 RX ADMIN — Medication 1 MILLIGRAM(S): at 10:19

## 2023-08-17 RX ADMIN — Medication 1 MILLIGRAM(S): at 13:15

## 2023-08-17 RX ADMIN — Medication 50 MILLIGRAM(S): at 20:54

## 2023-08-17 RX ADMIN — ATORVASTATIN CALCIUM 10 MILLIGRAM(S): 80 TABLET, FILM COATED ORAL at 20:54

## 2023-08-17 RX ADMIN — Medication 100 MILLIGRAM(S): at 10:19

## 2023-08-17 RX ADMIN — Medication 1 MILLIGRAM(S): at 17:57

## 2023-08-17 NOTE — BH INPATIENT PSYCHIATRY PROGRESS NOTE - NSBHCHARTREVIEWVS_PSY_A_CORE FT
Vital Signs Last 24 Hrs  T(C): 36.9 (08-17-23 @ 07:14), Max: 36.9 (08-17-23 @ 07:14)  T(F): 98.5 (08-17-23 @ 07:14), Max: 98.5 (08-17-23 @ 07:14)  HR: --  BP: --  BP(mean): --  RR: 16 (08-17-23 @ 07:14) (16 - 16)  SpO2: 95% (08-17-23 @ 07:14) (95% - 95%)    Orthostatic VS  08-17-23 @ 07:14  Lying BP: --/-- HR: --  Sitting BP: 105/76 HR: 64  Standing BP: 106/74 HR: 72  Site: upper right arm  Mode: electronic  Orthostatic VS  08-16-23 @ 07:11  Lying BP: --/-- HR: --  Sitting BP: 142/83 HR: 100  Standing BP: 106/77 HR: 100  Site: upper right arm  Mode: electronic

## 2023-08-17 NOTE — BH INPATIENT PSYCHIATRY PROGRESS NOTE - NSBHFUPINTERVALHXFT_PSY_A_CORE
Pt seen in her room where she sat on her bed, tense and massively anxious, quickly apologizing for a host of pt misperceived " mistakes" of thought and deed. The pt remains massively anxious and easily overwhelmed and distraught in the context of her near racing thoughts and catastrophizing related to her discharge and after care treatment. The pt remains suspicious but apologetic when stating her skepticism as to writer's assurance to the pt that the plan is for pt to return home with ongoing outpatient treatment once she is more clinically stable. The pt again asked, " I haven't had another  homicidal thought since that one time the other day. Will that thought keep me here forever? I wake up in terror. Hoping I will be able to go home after discharge but terrified that I will be put in a state facility for 6 months before I can even hope to return home. "   The pt was noted to be extremely thin and evidence of the pt's self inflicted shoe string ligature marks to her neck in a suicide gesture prior to her admission to hospital was apparent.     The pt presented as severely anxious, depressed and thought disordered with evidence of psychomotor agitation and a near permanent state of pt. panic mode with difficulty grasping even basic reassurances given to the pt by hospital staff and by her family. The pt appeared to be severely distraught and functionally impaired as a result of her current agitated depression with evidence of somatic and nihilistic delusional thinking.    The pt is tolerating her currently newly begun med regimen of Risperdal with dose now titrated to  3 mg po q hs  dose . Plan also discussed to add low dose standing Ativan to be increased to  1 mg po  4 times a day and to temporarily DC SSRI Lexapro due to potential for exacerbation of the pt's already severe anxious agitation. Plan as above with pt informed consent to begin the pt on a course of Depakote to better alleviate the pt's apparent severe affective instability.     	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  Mitigating factors- the pt is currently in the safe setting of an inpatient hospital unit and pt is amenable  to ongoing treatment of her severe mood disorder with thought disorganization , pt amenable to ongoing outpatient follow up treatment, pt able to form therapeutic alliances with select staff  	Potential Violence Risk	Low

## 2023-08-17 NOTE — BH INPATIENT PSYCHIATRY PROGRESS NOTE - NSBHFUPINTERVALCCFT_PSY_A_CORE
" I have to atone to my family and to God for the horrific thing I did."    Nutrition consult note appreciated  on 8/16/23 due to pt DM 2 and due to pt poor nutrition with BMI = 19. Pt diagnosed with severe protein-calorie malnutrition.    Plan for Ensure max BID . MVI with minerals, Thiamine 100 mg po q am                On 8/17/23 this writer spoke by phone  with the  pt's sister Hortensia   ( tel [phone]) to review and to update the pt's h/o affective psychosis with evidence consistent with a pt manic episode with psychosis in July of 2023 culminating in the pt's suicide attempt via hanging prior to admission to hospital. 	Discussion with pt and her family as to likely pt diagnosis of a bipolar d/o with psychotic features and plans to titrate Risperdal and Ativan  and to begin a pt trial of evidence based mood stabilizer Depakote ER  to alleviate the pt's severe and functionally impairing symptoms of a bipolar diathesis.  This writer also offered the pt and her family psychoeducational information related to bipolar d/o and treatment options including Risperdal and  this writer gave the pt written information related to bipolar d/o and Depakote as a treatment for this mood d/o.

## 2023-08-18 PROCEDURE — 99232 SBSQ HOSP IP/OBS MODERATE 35: CPT

## 2023-08-18 RX ORDER — DIVALPROEX SODIUM 500 MG/1
500 TABLET, DELAYED RELEASE ORAL AT BEDTIME
Refills: 0 | Status: DISCONTINUED | OUTPATIENT
Start: 2023-08-18 | End: 2023-08-24

## 2023-08-18 RX ADMIN — Medication 100 MILLIGRAM(S): at 10:29

## 2023-08-18 RX ADMIN — METFORMIN HYDROCHLORIDE 1000 MILLIGRAM(S): 850 TABLET ORAL at 13:33

## 2023-08-18 RX ADMIN — METFORMIN HYDROCHLORIDE 1000 MILLIGRAM(S): 850 TABLET ORAL at 21:05

## 2023-08-18 RX ADMIN — Medication 1 MILLIGRAM(S): at 18:11

## 2023-08-18 RX ADMIN — Medication 1 MILLIGRAM(S): at 21:05

## 2023-08-18 RX ADMIN — Medication 1 MILLIGRAM(S): at 10:28

## 2023-08-18 RX ADMIN — RISPERIDONE 3 MILLIGRAM(S): 4 TABLET ORAL at 21:05

## 2023-08-18 RX ADMIN — DIVALPROEX SODIUM 500 MILLIGRAM(S): 500 TABLET, DELAYED RELEASE ORAL at 21:05

## 2023-08-18 RX ADMIN — ATORVASTATIN CALCIUM 10 MILLIGRAM(S): 80 TABLET, FILM COATED ORAL at 21:06

## 2023-08-18 RX ADMIN — Medication 75 MILLIGRAM(S): at 21:06

## 2023-08-18 RX ADMIN — Medication 1 TABLET(S): at 10:29

## 2023-08-18 RX ADMIN — Medication 1 MILLIGRAM(S): at 13:34

## 2023-08-18 NOTE — BH INPATIENT PSYCHIATRY PROGRESS NOTE - NSBHFUPINTERVALCCFT_PSY_A_CORE
" I know I have been bothering you about discharge but I am ok with it. I read in the computer about being sent to a state hospital for 6 months but I believe you if you say I won't be. I have been attending every group and I have been sleeping better too."    Nutrition consult note appreciated  on 8/16/23 due to pt DM 2 and due to pt poor nutrition with BMI = 19. Pt diagnosed with severe protein-calorie malnutrition.    Plan for Ensure max BID . MVI with minerals, Thiamine 100 mg po q am                On 8/17/23 this writer spoke by phone  with the  pt's sister Hortensia   ( tel [phone]) to review and to update the pt's h/o affective psychosis with evidence consistent with a pt manic episode with psychosis in July of 2023 culminating in the pt's suicide attempt via hanging prior to admission to hospital. 	Discussion with pt and her family as to likely pt diagnosis of a bipolar d/o with psychotic features and plans to titrate Risperdal and Ativan  and to begin a pt trial of evidence based mood stabilizer Depakote ER  to alleviate the pt's severe and functionally impairing symptoms of a bipolar diathesis.  This writer also offered the pt and her family psychoeducational information related to bipolar d/o and treatment options including Risperdal and  this writer gave the pt written information related to bipolar d/o and Depakote as a treatment for this mood d/o.

## 2023-08-18 NOTE — BH DISCHARGE NOTE NURSING/SOCIAL WORK/PSYCH REHAB - NSDCNEXTLEVEL_PSY_ALL_CORE
Patient alert, oriented x3, pleasant and cooperative.  Pt denies S/H IIP currently.  Nurse and  reviewed discharge plan with patient who agrees and accepts plan.  Pt provided with a copy of discharge paperwork.  Pt appropriately dressed for discharge and is transported home by family,  to ensure safe arrival home./Yes

## 2023-08-18 NOTE — BH INPATIENT PSYCHIATRY PROGRESS NOTE - NSBHFUPINTERVALHXFT_PSY_A_CORE
Pt seen in the day room where she continues tense and massively anxious, but slightly less so.  The pt remains anxious but not quite as easily overwhelmed and distraught in the context of her near racing thoughts and catastrophizing related to her discharge and after care treatment. The pt remains suspicious but apologetic when stating her skepticism as to writer's assurance to the pt that the plan is for pt to return home with ongoing outpatient treatment once she is more clinically stable.   The pt was noted to be extremely thin and evidence of the pt's self inflicted shoe string ligature marks to her neck in a suicide gesture prior to her admission to hospital was apparent.     The pt presented as severely anxious, depressed and thought disordered with evidence of psychomotor agitation and a near permanent state of pt. panic mode with difficulty grasping even basic reassurances given to the pt by hospital staff and by her family. The pt appeared to be severely distraught and functionally impaired as a result of her current agitated depression with evidence of somatic and nihilistic delusional thinking.    The pt is tolerating her currently newly begun med regimen of Risperdal with dose now titrated to  3 mg po q hs  dose . Plan also discussed to add low dose standing Ativan to be increased to  1 mg po  4 times a day and to temporarily DC SSRI Lexapro due to potential for exacerbation of the pt's already severe anxious agitation. The pt is tolerating her newly begun course of evidence based mood stabilizer  Depakote  mg with the pt's treatment plan discussed with pt to slowly titrate the dose to 500 mg po qhs and to check a Depakote level on 8/21/23 with goal of  alleviating  the pt's  severe affective instability.     	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  Mitigating factors- the pt is currently in the safe setting of an inpatient hospital unit and pt is amenable  to ongoing treatment of her severe mood disorder with thought disorganization , pt amenable to ongoing outpatient follow up treatment, pt able to form therapeutic alliances with select staff  	Potential Violence Risk	Low

## 2023-08-18 NOTE — BH INPATIENT PSYCHIATRY PROGRESS NOTE - NSBHCHARTREVIEWVS_PSY_A_CORE FT
Vital Signs Last 24 Hrs  T(C): 36.4 (08-18-23 @ 07:10), Max: 36.4 (08-18-23 @ 07:10)  T(F): 97.6 (08-18-23 @ 07:10), Max: 97.6 (08-18-23 @ 07:10)  HR: --  BP: --  BP(mean): --  RR: 16 (08-18-23 @ 07:10) (16 - 16)  SpO2: 100% (08-18-23 @ 07:10) (100% - 100%)    Orthostatic VS  08-18-23 @ 07:10  Lying BP: --/-- HR: --  Sitting BP: 127/63 HR: 83  Standing BP: 117/67 HR: 91  Site: upper right arm  Mode: electronic  Orthostatic VS  08-17-23 @ 07:14  Lying BP: --/-- HR: --  Sitting BP: 105/76 HR: 64  Standing BP: 106/74 HR: 72  Site: upper right arm  Mode: electronic

## 2023-08-18 NOTE — BH DISCHARGE NOTE NURSING/SOCIAL WORK/PSYCH REHAB - NSDCADDINFO3FT_PSY_ALL_CORE
You have an appointment with your primary care doctor, Dr. Isaura New, in person on 8/29/23 at 9 AM.

## 2023-08-18 NOTE — BH DISCHARGE NOTE NURSING/SOCIAL WORK/PSYCH REHAB - NSBHCRISISRESOURCESOTHER_PSY_ALL_CORE_FT
[facility] [address] 24[address] Sierra Blake, RN Nurse Manager  [phone]  Any issues or concerns with your medication prior to seeing Dr. Mckeon, please call Dr. Pendleton at [phone]

## 2023-08-18 NOTE — BH DISCHARGE NOTE NURSING/SOCIAL WORK/PSYCH REHAB - NSCDUDCCRISIS_PSY_A_CORE
.  [facility] Crisis [facility] for Children, Adults and Families  [address] [place] [address]  Mobile Crisis Hotline – (948) 757-4585/.National Suicide Prevention Lifeline 8 (837) 754-9033/.  [facility] Response Crisis Hotline  (660) 261-5562  24 hour telephone crisis intervention and suicide prevention hotline concerned with all mental health issues/Other.../988 Suicide and Crisis Lifeline

## 2023-08-18 NOTE — BH DISCHARGE NOTE NURSING/SOCIAL WORK/PSYCH REHAB - PATIENT PORTAL LINK FT
You can access the FollowMyHealth Patient Portal offered by [facility] by registering at the following website: [email or web address]. By joining [employer]’s FollowMyHealth portal, you will also be able to view your health information using other applications (apps) compatible with our system.

## 2023-08-19 RX ORDER — TRAZODONE HCL 50 MG
100 TABLET ORAL AT BEDTIME
Refills: 0 | Status: DISCONTINUED | OUTPATIENT
Start: 2023-08-19 | End: 2023-08-23

## 2023-08-19 RX ADMIN — METFORMIN HYDROCHLORIDE 1000 MILLIGRAM(S): 850 TABLET ORAL at 21:30

## 2023-08-19 RX ADMIN — DIVALPROEX SODIUM 500 MILLIGRAM(S): 500 TABLET, DELAYED RELEASE ORAL at 21:30

## 2023-08-19 RX ADMIN — Medication 1 MILLIGRAM(S): at 16:38

## 2023-08-19 RX ADMIN — Medication 100 MILLIGRAM(S): at 21:30

## 2023-08-19 RX ADMIN — RISPERIDONE 3 MILLIGRAM(S): 4 TABLET ORAL at 21:31

## 2023-08-19 RX ADMIN — METFORMIN HYDROCHLORIDE 1000 MILLIGRAM(S): 850 TABLET ORAL at 10:02

## 2023-08-19 RX ADMIN — Medication 100 MILLIGRAM(S): at 10:02

## 2023-08-19 RX ADMIN — Medication 1 MILLIGRAM(S): at 12:37

## 2023-08-19 RX ADMIN — Medication 1 MILLIGRAM(S): at 21:30

## 2023-08-19 RX ADMIN — Medication 1 MILLIGRAM(S): at 10:02

## 2023-08-19 RX ADMIN — ATORVASTATIN CALCIUM 10 MILLIGRAM(S): 80 TABLET, FILM COATED ORAL at 21:31

## 2023-08-19 RX ADMIN — Medication 1 TABLET(S): at 10:02

## 2023-08-20 RX ADMIN — Medication 100 MILLIGRAM(S): at 21:17

## 2023-08-20 RX ADMIN — METFORMIN HYDROCHLORIDE 1000 MILLIGRAM(S): 850 TABLET ORAL at 21:38

## 2023-08-20 RX ADMIN — Medication 100 MILLIGRAM(S): at 09:28

## 2023-08-20 RX ADMIN — METFORMIN HYDROCHLORIDE 1000 MILLIGRAM(S): 850 TABLET ORAL at 09:26

## 2023-08-20 RX ADMIN — Medication 1 MILLIGRAM(S): at 21:17

## 2023-08-20 RX ADMIN — Medication 1 MILLIGRAM(S): at 13:35

## 2023-08-20 RX ADMIN — RISPERIDONE 3 MILLIGRAM(S): 4 TABLET ORAL at 21:16

## 2023-08-20 RX ADMIN — HALOPERIDOL DECANOATE 5 MILLIGRAM(S): 100 INJECTION INTRAMUSCULAR at 21:17

## 2023-08-20 RX ADMIN — Medication 1 TABLET(S): at 09:26

## 2023-08-20 RX ADMIN — DIVALPROEX SODIUM 500 MILLIGRAM(S): 500 TABLET, DELAYED RELEASE ORAL at 21:16

## 2023-08-20 RX ADMIN — ATORVASTATIN CALCIUM 10 MILLIGRAM(S): 80 TABLET, FILM COATED ORAL at 21:17

## 2023-08-20 RX ADMIN — Medication 50 MILLIGRAM(S): at 21:16

## 2023-08-20 RX ADMIN — Medication 1 MILLIGRAM(S): at 09:26

## 2023-08-20 RX ADMIN — Medication 1 MILLIGRAM(S): at 18:06

## 2023-08-21 LAB
BASOPHILS # BLD AUTO: 0.02 K/UL — SIGNIFICANT CHANGE UP (ref 0–0.2)
BASOPHILS NFR BLD AUTO: 0.4 % — SIGNIFICANT CHANGE UP (ref 0–2)
EOSINOPHIL # BLD AUTO: 0.09 K/UL — SIGNIFICANT CHANGE UP (ref 0–0.5)
EOSINOPHIL NFR BLD AUTO: 1.9 % — SIGNIFICANT CHANGE UP (ref 0–6)
HCT VFR BLD CALC: 41.3 % — SIGNIFICANT CHANGE UP (ref 34.5–45)
HGB BLD-MCNC: 13.6 G/DL — SIGNIFICANT CHANGE UP (ref 11.5–15.5)
IMM GRANULOCYTES NFR BLD AUTO: 0.2 % — SIGNIFICANT CHANGE UP (ref 0–0.9)
LYMPHOCYTES # BLD AUTO: 1.38 K/UL — SIGNIFICANT CHANGE UP (ref 1–3.3)
LYMPHOCYTES # BLD AUTO: 28.5 % — SIGNIFICANT CHANGE UP (ref 13–44)
MCHC RBC-ENTMCNC: 28.1 PG — SIGNIFICANT CHANGE UP (ref 27–34)
MCHC RBC-ENTMCNC: 32.9 GM/DL — SIGNIFICANT CHANGE UP (ref 32–36)
MCV RBC AUTO: 85.3 FL — SIGNIFICANT CHANGE UP (ref 80–100)
MONOCYTES # BLD AUTO: 0.35 K/UL — SIGNIFICANT CHANGE UP (ref 0–0.9)
MONOCYTES NFR BLD AUTO: 7.2 % — SIGNIFICANT CHANGE UP (ref 2–14)
NEUTROPHILS # BLD AUTO: 3 K/UL — SIGNIFICANT CHANGE UP (ref 1.8–7.4)
NEUTROPHILS NFR BLD AUTO: 61.8 % — SIGNIFICANT CHANGE UP (ref 43–77)
PLATELET # BLD AUTO: 243 K/UL — SIGNIFICANT CHANGE UP (ref 150–400)
RBC # BLD: 4.84 M/UL — SIGNIFICANT CHANGE UP (ref 3.8–5.2)
RBC # FLD: 12.6 % — SIGNIFICANT CHANGE UP (ref 10.3–14.5)
VALPROATE SERPL-MCNC: 70 UG/ML — SIGNIFICANT CHANGE UP (ref 50–100)
WBC # BLD: 4.85 K/UL — SIGNIFICANT CHANGE UP (ref 3.8–10.5)
WBC # FLD AUTO: 4.85 K/UL — SIGNIFICANT CHANGE UP (ref 3.8–10.5)

## 2023-08-21 PROCEDURE — 99232 SBSQ HOSP IP/OBS MODERATE 35: CPT

## 2023-08-21 RX ORDER — RISPERIDONE 4 MG/1
1 TABLET ORAL DAILY
Refills: 0 | Status: DISCONTINUED | OUTPATIENT
Start: 2023-08-22 | End: 2023-08-22

## 2023-08-21 RX ORDER — RISPERIDONE 4 MG/1
0.5 TABLET ORAL ONCE
Refills: 0 | Status: COMPLETED | OUTPATIENT
Start: 2023-08-21 | End: 2023-08-21

## 2023-08-21 RX ADMIN — Medication 1 MILLIGRAM(S): at 09:29

## 2023-08-21 RX ADMIN — Medication 1 TABLET(S): at 09:29

## 2023-08-21 RX ADMIN — Medication 1 MILLIGRAM(S): at 13:51

## 2023-08-21 RX ADMIN — ATORVASTATIN CALCIUM 10 MILLIGRAM(S): 80 TABLET, FILM COATED ORAL at 20:46

## 2023-08-21 RX ADMIN — DIVALPROEX SODIUM 500 MILLIGRAM(S): 500 TABLET, DELAYED RELEASE ORAL at 20:46

## 2023-08-21 RX ADMIN — RISPERIDONE 0.5 MILLIGRAM(S): 4 TABLET ORAL at 13:50

## 2023-08-21 RX ADMIN — HALOPERIDOL DECANOATE 5 MILLIGRAM(S): 100 INJECTION INTRAMUSCULAR at 20:47

## 2023-08-21 RX ADMIN — Medication 1 MILLIGRAM(S): at 20:46

## 2023-08-21 RX ADMIN — Medication 50 MILLIGRAM(S): at 20:47

## 2023-08-21 RX ADMIN — Medication 100 MILLIGRAM(S): at 09:29

## 2023-08-21 RX ADMIN — RISPERIDONE 3 MILLIGRAM(S): 4 TABLET ORAL at 20:47

## 2023-08-21 RX ADMIN — METFORMIN HYDROCHLORIDE 1000 MILLIGRAM(S): 850 TABLET ORAL at 09:29

## 2023-08-21 RX ADMIN — METFORMIN HYDROCHLORIDE 1000 MILLIGRAM(S): 850 TABLET ORAL at 20:44

## 2023-08-21 NOTE — BH INPATIENT PSYCHIATRY PROGRESS NOTE - NSBHFUPINTERVALCCFT_PSY_A_CORE
" I have been going to all the groups and I have been taking my medicine as prescribed. Are you sure you are not going to send me to a state psychiatric hospital? "    Nutrition consult note appreciated  on 8/16/23 due to pt DM 2 and due to pt poor nutrition with BMI = 19. Pt diagnosed with severe protein-calorie malnutrition.    Plan for Ensure max BID . MVI with minerals, Thiamine 100 mg po q am                On 8/21/23 this writer spoke by phone  with the  pt's sister Hortensia   ( tel [phone]) who had called writer  to review and to update the pt's h/o affective psychosis with evidence consistent with a pt manic episode with psychosis in July of 2023 culminating in the pt's suicide attempt via hanging prior to admission to hospital. 	Discussion with pt and her family as to likely pt diagnosis of a bipolar d/o with psychotic features and plans to titrate Risperdal and to begin a taper of  Ativan  and to begin a pt trial of evidence based mood stabilizer Depakote ER  to alleviate the pt's severe and functionally impairing symptoms of a bipolar diathesis.  This writer also offered the pt and her family psychoeducational information related to bipolar d/o and treatment options including Risperdal and  this writer gave the pt written information related to bipolar d/o and Depakote as a treatment for this mood d/o.

## 2023-08-21 NOTE — BH INPATIENT PSYCHIATRY PROGRESS NOTE - NSBHCHARTREVIEWVS_PSY_A_CORE FT
Vital Signs Last 24 Hrs  T(C): 36.3 (08-21-23 @ 07:10), Max: 36.3 (08-21-23 @ 07:10)  T(F): 97.4 (08-21-23 @ 07:10), Max: 97.4 (08-21-23 @ 07:10)  HR: --  BP: --  BP(mean): --  RR: 16 (08-21-23 @ 07:10) (16 - 16)  SpO2: 100% (08-21-23 @ 07:10) (100% - 100%)    Orthostatic VS  08-21-23 @ 07:10  Lying BP: --/-- HR: --  Sitting BP: 133/79 HR: 83  Standing BP: 118/75 HR: 87  Site: upper right arm  Mode: electronic  Orthostatic VS  08-20-23 @ 08:32  Lying BP: --/-- HR: --  Sitting BP: 100/65 HR: 102  Standing BP: 102/71 HR: 85  Site: upper right arm  Mode: electronic

## 2023-08-21 NOTE — BH INPATIENT PSYCHIATRY PROGRESS NOTE - NSBHFUPINTERVALHXFT_PSY_A_CORE
Pt seen in the day room where she continues tense and massively anxious, but slowly less so.  The pt remains anxious but not quite as easily overwhelmed and distraught in the context of her near racing thoughts and catastrophizing related to her discharge and after care treatment. The pt remains suspicious but apologetic when stating her skepticism as to writer's assurance to the pt that the plan is for pt to return home with ongoing outpatient treatment once she is more clinically stable.   The pt was noted to be extremely thin and evidence of the pt's self inflicted shoe string ligature marks to her neck in a suicide gesture prior to her admission to hospital was apparent.     The pt presented as severely anxious, depressed and thought disordered with evidence of psychomotor agitation and a near permanent state of pt. panic mode with difficulty grasping even basic reassurances given to the pt by hospital staff and by her family. The pt appeared to be severely distraught and functionally impaired as a result of her current agitated depression with evidence of somatic and nihilistic delusional thinking.    The pt is tolerating her currently newly begun med regimen of Risperdal with dose now titrated to   0.5 mg po q am and 3 mg po q hs  dose . Plan also discussed to add low dose standing Ativan to be decreased to  1 mg po  3 times a day and to temporarily DC SSRI Lexapro due to potential for exacerbation of the pt's already severe anxious agitation. The pt is tolerating her newly begun course of evidence based mood stabilizer  Depakote  mg with the pt's treatment plan discussed with pt to slowly titrate the dose to 500 mg po qhs and to check a Depakote level on 8/21/23 with goal of  alleviating  the pt's  severe affective instability.    The pt's Depakote level = 70 ug/ml  with platelets WNL.   	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  Mitigating factors- the pt is currently in the safe setting of an inpatient hospital unit and pt is amenable  to ongoing treatment of her severe mood disorder with thought disorganization , pt amenable to ongoing outpatient follow up treatment, pt able to form therapeutic alliances with select staff  	Potential Violence Risk	Low

## 2023-08-22 PROCEDURE — 99232 SBSQ HOSP IP/OBS MODERATE 35: CPT

## 2023-08-22 RX ORDER — RISPERIDONE 4 MG/1
0.5 TABLET ORAL DAILY
Refills: 0 | Status: DISCONTINUED | OUTPATIENT
Start: 2023-08-23 | End: 2023-08-24

## 2023-08-22 RX ADMIN — Medication 1 TABLET(S): at 09:11

## 2023-08-22 RX ADMIN — RISPERIDONE 1 MILLIGRAM(S): 4 TABLET ORAL at 09:12

## 2023-08-22 RX ADMIN — Medication 1 MILLIGRAM(S): at 14:06

## 2023-08-22 RX ADMIN — ATORVASTATIN CALCIUM 10 MILLIGRAM(S): 80 TABLET, FILM COATED ORAL at 20:49

## 2023-08-22 RX ADMIN — METFORMIN HYDROCHLORIDE 1000 MILLIGRAM(S): 850 TABLET ORAL at 09:10

## 2023-08-22 RX ADMIN — Medication 100 MILLIGRAM(S): at 09:11

## 2023-08-22 RX ADMIN — Medication 100 MILLIGRAM(S): at 20:49

## 2023-08-22 RX ADMIN — RISPERIDONE 3 MILLIGRAM(S): 4 TABLET ORAL at 20:49

## 2023-08-22 RX ADMIN — Medication 1 MILLIGRAM(S): at 22:38

## 2023-08-22 RX ADMIN — Medication 50 MILLIGRAM(S): at 20:49

## 2023-08-22 RX ADMIN — Medication 1 MILLIGRAM(S): at 20:48

## 2023-08-22 RX ADMIN — Medication 1 MILLIGRAM(S): at 09:10

## 2023-08-22 RX ADMIN — DIVALPROEX SODIUM 500 MILLIGRAM(S): 500 TABLET, DELAYED RELEASE ORAL at 20:49

## 2023-08-22 RX ADMIN — METFORMIN HYDROCHLORIDE 1000 MILLIGRAM(S): 850 TABLET ORAL at 20:48

## 2023-08-22 NOTE — BH INPATIENT PSYCHIATRY PROGRESS NOTE - NSBHFUPINTERVALHXFT_PSY_A_CORE
Pt seen in the day room where she continues anxious and somewhat hypervigilant and suspicious  but not overwhelmed as before . The pt remains anxious but not quite as easily overwhelmed and distraught in the context of her near racing thoughts and catastrophizing related to her discharge and after care treatment. The pt remains suspicious but apologetic when stating her skepticism as to writer's assurance to the pt that the plan is for pt to return home with ongoing outpatient treatment once she is more clinically stable.   The pt was noted to be extremely thin and evidence of the pt's self inflicted shoe string ligature marks to her neck in a suicide gesture prior to her admission to hospital was apparent.     The pt presented as severely anxious, depressed and thought disordered with evidence of psychomotor agitation and a near permanent state of pt. panic mode with difficulty grasping even basic reassurances given to the pt by hospital staff and by her family. The pt appeared to be severely distraught and functionally impaired as a result of her current agitated depression with evidence of somatic and nihilistic delusional thinking.    The pt is tolerating her currently newly begun med regimen of Risperdal with dose now titrated to   0.5 mg po q am and 3 mg po q hs  dose . The pt's Risperdal am dose increased to 1 mg will be lowered to 0.5 mg po q am as above due to pt reported increased daytime sedation. The pt will continue with HS Risperdal 3 mg po qhs and Depakote  mg po qhs, both well tolerated and with continuing good clinical effect.  Plan also discussed to  lower  standing Ativan  dose to   1 mg po  2 times a day  with DC  of prior SSRI Lexapro due to potential for exacerbation of the pt's already severe anxious agitation. The pt is tolerating her newly begun course of evidence based mood stabilizer  Depakote  mg with the pt's treatment plan discussed with pt to slowly titrate the dose to 500 mg po qhs and to check a Depakote level on 8/21/23 with goal of  alleviating  the pt's  severe affective instability.    The pt's Depakote level = 70 ug/ml  with platelets WNL.   	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  Mitigating factors- the pt is currently in the safe setting of an inpatient hospital unit and pt is amenable  to ongoing treatment of her severe mood disorder with thought disorganization , pt amenable to ongoing outpatient follow up treatment, pt able to form therapeutic alliances with select staff  	Potential Violence Risk	Low

## 2023-08-22 NOTE — BH INPATIENT PSYCHIATRY PROGRESS NOTE - NSBHCHARTREVIEWVS_PSY_A_CORE FT
Vital Signs Last 24 Hrs  T(C): 36.3 (08-22-23 @ 07:08), Max: 36.3 (08-22-23 @ 07:08)  T(F): 97.4 (08-22-23 @ 07:08), Max: 97.4 (08-22-23 @ 07:08)  HR: --  BP: --  BP(mean): --  RR: 16 (08-22-23 @ 07:08) (16 - 16)  SpO2: 100% (08-22-23 @ 07:08) (100% - 100%)    Orthostatic VS  08-22-23 @ 07:08  Lying BP: --/-- HR: --  Sitting BP: 118/68 HR: 73  Standing BP: 113/67 HR: 80  Site: upper right arm  Mode: electronic  Orthostatic VS  08-21-23 @ 07:10  Lying BP: --/-- HR: --  Sitting BP: 133/79 HR: 83  Standing BP: 118/75 HR: 87  Site: upper right arm  Mode: electronic

## 2023-08-22 NOTE — BH INPATIENT PSYCHIATRY PROGRESS NOTE - NSBHFUPINTERVALCCFT_PSY_A_CORE
" I am feeling much better. The medicine this morning made me sleepy though when it was raised."    Nutrition consult note appreciated  on 8/16/23 due to pt DM 2 and due to pt poor nutrition with BMI = 19. Pt diagnosed with severe protein-calorie malnutrition.    Plan for Ensure max BID . MVI with minerals, Thiamine 100 mg po q am                On 8/21/23 this writer spoke by phone  with the  pt's sister Hortensia   ( tel [phone]) who had called writer  to review and to update the pt's h/o affective psychosis with evidence consistent with a pt manic episode with psychosis in July of 2023 culminating in the pt's suicide attempt via hanging prior to admission to hospital. 	Discussion with pt and her family as to likely pt diagnosis of a bipolar d/o with psychotic features and plans to titrate Risperdal and to begin a taper of  Ativan  and to begin a pt trial of evidence based mood stabilizer Depakote ER  to alleviate the pt's severe and functionally impairing symptoms of a bipolar diathesis.  This writer also offered the pt and her family psychoeducational information related to bipolar d/o and treatment options including Risperdal and  this writer gave the pt written information related to bipolar d/o and Depakote as a treatment for this mood d/o.

## 2023-08-22 NOTE — BH INPATIENT PSYCHIATRY PROGRESS NOTE - OTHER
becoming more euthymic less labile and keating in range and intensity " I feel excellent. Very good." selectively social with other female peers speech is rapid but no longer pressured . Speech is articulate and  goal directed

## 2023-08-23 PROCEDURE — 99232 SBSQ HOSP IP/OBS MODERATE 35: CPT

## 2023-08-23 RX ORDER — TRAZODONE HCL 50 MG
1 TABLET ORAL
Refills: 1
Start: 2023-08-23 | End: 2023-09-19

## 2023-08-23 RX ORDER — RISPERIDONE 4 MG/1
1 TABLET ORAL
Qty: 14 | Refills: 1
Start: 2023-08-23 | End: 2023-09-19

## 2023-08-23 RX ORDER — TRAZODONE HCL 50 MG
1 TABLET ORAL
Qty: 0 | Refills: 0
Start: 2023-08-23

## 2023-08-23 RX ORDER — TRAZODONE HCL 50 MG
100 TABLET ORAL AT BEDTIME
Refills: 0 | Status: DISCONTINUED | OUTPATIENT
Start: 2023-08-23 | End: 2023-08-24

## 2023-08-23 RX ORDER — DIVALPROEX SODIUM 500 MG/1
1 TABLET, DELAYED RELEASE ORAL
Qty: 0 | Refills: 0 | DISCHARGE
Start: 2023-08-23

## 2023-08-23 RX ORDER — TRAZODONE HCL 50 MG
1 TABLET ORAL
Qty: 14 | Refills: 1
Start: 2023-08-23 | End: 2023-09-19

## 2023-08-23 RX ORDER — MULTIVIT-MIN/FERROUS GLUCONATE 9 MG/15 ML
1 LIQUID (ML) ORAL
Qty: 0 | Refills: 0 | DISCHARGE
Start: 2023-08-23

## 2023-08-23 RX ORDER — DIVALPROEX SODIUM 500 MG/1
1 TABLET, DELAYED RELEASE ORAL
Qty: 14 | Refills: 1
Start: 2023-08-23 | End: 2023-09-19

## 2023-08-23 RX ORDER — THIAMINE MONONITRATE (VIT B1) 100 MG
1 TABLET ORAL
Qty: 0 | Refills: 0 | DISCHARGE
Start: 2023-08-23

## 2023-08-23 RX ORDER — ATORVASTATIN CALCIUM 80 MG/1
1 TABLET, FILM COATED ORAL
Qty: 14 | Refills: 1
Start: 2023-08-23 | End: 2023-09-19

## 2023-08-23 RX ORDER — ESCITALOPRAM OXALATE 10 MG/1
1.5 TABLET, FILM COATED ORAL
Refills: 0 | DISCHARGE

## 2023-08-23 RX ORDER — ATORVASTATIN CALCIUM 80 MG/1
1 TABLET, FILM COATED ORAL
Refills: 0 | DISCHARGE

## 2023-08-23 RX ADMIN — METFORMIN HYDROCHLORIDE 1000 MILLIGRAM(S): 850 TABLET ORAL at 09:36

## 2023-08-23 RX ADMIN — Medication 1 MILLIGRAM(S): at 20:43

## 2023-08-23 RX ADMIN — DIVALPROEX SODIUM 500 MILLIGRAM(S): 500 TABLET, DELAYED RELEASE ORAL at 20:43

## 2023-08-23 RX ADMIN — Medication 100 MILLIGRAM(S): at 09:37

## 2023-08-23 RX ADMIN — RISPERIDONE 0.5 MILLIGRAM(S): 4 TABLET ORAL at 09:36

## 2023-08-23 RX ADMIN — RISPERIDONE 3 MILLIGRAM(S): 4 TABLET ORAL at 20:44

## 2023-08-23 RX ADMIN — METFORMIN HYDROCHLORIDE 1000 MILLIGRAM(S): 850 TABLET ORAL at 20:44

## 2023-08-23 RX ADMIN — Medication 100 MILLIGRAM(S): at 20:44

## 2023-08-23 RX ADMIN — Medication 1 MILLIGRAM(S): at 09:36

## 2023-08-23 RX ADMIN — Medication 1 TABLET(S): at 09:37

## 2023-08-23 RX ADMIN — Medication 50 MILLIGRAM(S): at 20:44

## 2023-08-23 RX ADMIN — ATORVASTATIN CALCIUM 10 MILLIGRAM(S): 80 TABLET, FILM COATED ORAL at 20:42

## 2023-08-23 RX ADMIN — Medication 1 MILLIGRAM(S): at 15:58
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
## 2023-08-23 NOTE — BH INPATIENT PSYCHIATRY DISCHARGE NOTE - OTHER
selectively social with other female peers " I feel very good." becoming more euthymic less labile and keating in range and intensity speech is rapid but no longer pressured . Speech is articulate and  goal directed

## 2023-08-23 NOTE — BH INPATIENT PSYCHIATRY PROGRESS NOTE - NSBHCHARTREVIEWVS_PSY_A_CORE FT
Vital Signs Last 24 Hrs  T(C): 36.1 (08-23-23 @ 07:55), Max: 36.1 (08-23-23 @ 07:55)  T(F): 97 (08-23-23 @ 07:55), Max: 97 (08-23-23 @ 07:55)  HR: --  BP: --  BP(mean): --  RR: 18 (08-23-23 @ 07:55) (18 - 18)  SpO2: 100% (08-23-23 @ 07:55) (100% - 100%)    Orthostatic VS  08-23-23 @ 07:55  Lying BP: --/-- HR: --  Sitting BP: 102/78 HR: 82  Standing BP: 107/57 HR: 93  Site: upper right arm  Mode: electronic  Orthostatic VS  08-22-23 @ 07:08  Lying BP: --/-- HR: --  Sitting BP: 118/68 HR: 73  Standing BP: 113/67 HR: 80  Site: upper right arm  Mode: electronic

## 2023-08-23 NOTE — BH INPATIENT PSYCHIATRY DISCHARGE NOTE - NSBHSUICIDESTATUS_PSY_ALL_CORE
The  pt continues to deny any current active SI  or HI and she denies any current plan or intent to harm either herself or anyone else at this time

## 2023-08-23 NOTE — BH INPATIENT PSYCHIATRY DISCHARGE NOTE - NSBHASSESSSUMMFT_PSY_ALL_CORE
low risk as the opt denies any suicidal ideation intent or plan , pt with remorse for SA   mitigating pt is motivated for improvement   protective has family and family support , place to live  chronic pt with lack of coping skills for stress

## 2023-08-23 NOTE — BH INPATIENT PSYCHIATRY PROGRESS NOTE - NSBHFUPINTERVALHXFT_PSY_A_CORE
Pt seen in the day room where she continues anxious with intense eye contact but overall, less anxious and less hypervigilant though still seeking staff reassurance frequently throughout the day.  The pt appears largely appropriately convinced  as to the pt's belief in this writer's assurance to the pt that the plan is for pt to return home with ongoing outpatient treatment once she is more clinically stable.   The pt was noted to be quite  thin and evidence of gradually healing  pt's self inflicted shoe string ligature marks to her neck in a suicide gesture prior to her admission to hospital was apparent.     The pt is tolerating her currently newly begun med regimen of Risperdal with dose now titrated to   0.5 mg po q am and 3 mg po q hs  dose . The pt's Risperdal am dose increased to 1 mg will be lowered to 0.5 mg po q am as above due to pt reported increased daytime sedation. The pt will continue with HS Risperdal 3 mg po qhs and Depakote  mg po qhs, both well tolerated and with continuing good clinical effect.  Plan also discussed to  lower  standing Ativan  dose to   1 mg po  2 times a day  with DC  of prior SSRI Lexapro due to potential for exacerbation of the pt's already severe anxious agitation. The pt is tolerating her newly begun course of evidence based mood stabilizer  Depakote  mg with the pt's treatment plan discussed with pt to slowly titrate the dose to 500 mg po qhs and to check a Depakote level on 8/21/23 with goal of  alleviating  the pt's  severe affective instability.    The pt's Depakote level = 70 ug/ml  with platelets WNL.   	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  Mitigating factors- the pt is currently in the safe setting of an inpatient hospital unit and pt is amenable  to ongoing treatment of her severe mood disorder with thought disorganization , pt amenable to ongoing outpatient follow up treatment, pt able to form therapeutic alliances with select staff  	Potential Violence Risk	Low

## 2023-08-23 NOTE — BH INPATIENT PSYCHIATRY DISCHARGE NOTE - NSBHMETABOLIC_PSY_ALL_CORE_FT
BMI: BMI (kg/m2): 19 (08-11-23 @ 17:25)  HbA1c: A1C with Estimated Average Glucose Result: 6.7 % (08-12-23 @ 08:54)  Glucose: POCT Blood Glucose.: 167 mg/dL (08-13-23 @ 21:36)  Lipid Panel: Date/Time: 08-12-23 @ 08:54  Cholesterol, Serum: 137  Direct LDL: --  HDL Cholesterol, Serum: 69  Total Cholesterol/HDL Ration Measurement: --  Triglycerides, Serum: 98

## 2023-08-23 NOTE — BH INPATIENT PSYCHIATRY DISCHARGE NOTE - NSDCRECOMMENDLABFT_PSY_ALL_CORE
Fasting lipids, HgA1C, CMP, CBC with diff q 6 monthly with second generation antipsychotic Risperdal  Depakote level q 3-4 monthly  Monthly monitoring of vital signs including weight and waist circumference with Risperdal

## 2023-08-23 NOTE — BH INPATIENT PSYCHIATRY DISCHARGE NOTE - HOSPITAL COURSE
" I am feeling much better. I am very happy to be going home soon and seeing my doctor and my therapist again."    Nutrition consult note appreciated  on 8/16/23 due to pt DM 2 and due to pt poor nutrition with BMI = 19. Pt diagnosed with severe protein-calorie malnutrition.    Plan for Ensure max BID . MVI with minerals, Thiamine 100 mg po q am                On 8/21/23 this writer spoke by phone  with the  pt's sister Hortensia   ( tel [phone]) who had called writer  to review and to update the pt's h/o affective psychosis with evidence consistent with a pt manic episode with psychosis in July of 2023 culminating in the pt's suicide attempt via hanging prior to admission to hospital. Discussion with pt and her family as to likely pt diagnosis of a bipolar d/o with psychotic features and plans to titrate Risperdal and to begin a taper of  Ativan  and to begin a pt trial oDf evidence based mood stabilizer Depakote ER  to alleviate the pt's severe and functionally impairing symptoms of a bipolar diathesis.  This writer also offered the pt and her family psychoeducational information related to bipolar d/o and treatment options including Risperdal and  this writer gave the pt written information related to bipolar d/o and Depakote as a treatment for this mood d/o.                     Pt seen in the day room prior to her discharge home from hospital on 8/24/23. The pt continues anxious with intense eye contact but overall, less anxious and less hypervigilant though still seeking staff reassurance frequently throughout the day.  The pt appears largely appropriately convinced  as to the pt's belief in this writer's assurance to the pt that the plan is for pt to return home with ongoing outpatient treatment once she is more clinically stable.   The pt was noted to be quite  thin and evidence of gradually healing  pt's self inflicted shoe string ligature marks to her neck in a suicide gesture prior to her admission to hospital was apparent.     The pt is tolerating her currently newly begun med regimen of Risperdal with dose now titrated to   0.5 mg po q am and 3 mg po q hs  dose . The pt's Risperdal am dose increased to 1 mg will be lowered to 0.5 mg po q am as above due to pt reported increased daytime sedation. The pt will continue with HS Risperdal 3 mg po qhs and Depakote  mg po qhs, both well tolerated and with continuing good clinical effect.  Plan also discussed to  lower  standing Ativan  dose to   1 mg po  2 times a day  with DC  of prior SSRI Lexapro due to potential for exacerbation of the pt's already severe anxious agitation. The pt is tolerating her newly begun course of evidence based mood stabilizer  Depakote  mg with the pt's treatment plan discussed with pt to slowly titrate the dose to 500 mg po qhs and to check a Depakote level on 8/21/23 with goal of  alleviating  the pt's  severe affective instability.    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  Mitigating factors- the pt is currently in the safe setting of an inpatient hospital unit and pt is amenable  to ongoing treatment of her severe mood disorder with thought disorganization , pt amenable to ongoing outpatient follow up treatment, pt able to form therapeutic alliances with select staff

## 2023-08-23 NOTE — BH INPATIENT PSYCHIATRY DISCHARGE NOTE - NSBHDCHANDOFFFT_PSY_ALL_CORE
The pt is amenable to continue in her outpatient treatment with psychiatrist Dr. Mckeon ( appointment date pending)  and with her psychologist Dr. Condon with next appointment for therapy on 8/24/23 at 4 pm  The pt is also amenable to follow up with her PCP Dr. New with next appointment on 8/29/23 at 9 am  Pt clinical handoff completed by hospital staff with writer phone conversation with Dr. Mckeon  Safety planning reviewed with the pt who can contact Dr. Pendleton at [facility] at ([phone])   prior to pt follow up appointments with any pt questions or concerns

## 2023-08-23 NOTE — BH INPATIENT PSYCHIATRY DISCHARGE NOTE - NSDCMRMEDTOKEN_GEN_ALL_CORE_FT
atorvastatin 10 mg oral tablet: 1 tab(s) orally once a day (at bedtime)  divalproex sodium 500 mg oral tablet, extended release: 1 tab(s) orally once a day (at bedtime)  Jardiance 25 mg oral tablet: 1 tab(s) orally once a day  LORazepam 1 mg oral tablet: 1 tab(s) orally 2 times a day MDD: 2 mg/day  metFORMIN 500 mg oral tablet, extended release: 4 tab(s) orally once a day  Multiple Vitamins with Minerals oral tablet: 1 tab(s) orally once a day  risperiDONE 0.5 mg oral tablet: 1 tab(s) orally once a day  risperiDONE 3 mg oral tablet: 1 tab(s) orally once a day (at bedtime)  thiamine 100 mg oral tablet: 1 tab(s) orally once a day   atorvastatin 10 mg oral tablet: 1 tab(s) orally once a day (at bedtime)  divalproex sodium 500 mg oral tablet, extended release: 1 tab(s) orally once a day (at bedtime)  Jardiance 25 mg oral tablet: 1 tab(s) orally once a day  LORazepam 1 mg oral tablet: 1 tab(s) orally 2 times a day MDD: 2 mg/day  metFORMIN 500 mg oral tablet, extended release: 4 tab(s) orally once a day  Multiple Vitamins with Minerals oral tablet: 1 tab(s) orally once a day  risperiDONE 0.5 mg oral tablet: 1 tab(s) orally once a day  risperiDONE 3 mg oral tablet: 1 tab(s) orally once a day (at bedtime)  thiamine 100 mg oral tablet: 1 tab(s) orally once a day  traZODone 100 mg oral tablet: 1 tab(s) orally once a day (at bedtime)

## 2023-08-23 NOTE — BH INPATIENT PSYCHIATRY PROGRESS NOTE - OTHER
becoming more euthymic speech is rapid but no longer pressured . Speech is articulate and  goal directed  less labile and keating in range and intensity " I feel very good." selectively social with other female peers

## 2023-08-23 NOTE — BH INPATIENT PSYCHIATRY DISCHARGE NOTE - NSBHFUPINTERVALCCFT_PSY_A_CORE
" I am feeling much better. I am very happy to be going home soon and seeing my doctor and my therapist again."    Nutrition consult note appreciated  on 8/16/23 due to pt DM 2 and due to pt poor nutrition with BMI = 19. Pt diagnosed with severe protein-calorie malnutrition.    Plan for Ensure max BID . MVI with minerals, Thiamine 100 mg po q am                On 8/21/23 this writer spoke by phone  with the  pt's sister Hortensia   ( tel [phone]) who had called writer  to review and to update the pt's h/o affective psychosis with evidence consistent with a pt manic episode with psychosis in July of 2023 culminating in the pt's suicide attempt via hanging prior to admission to hospital. 	Discussion with pt and her family as to likely pt diagnosis of a bipolar d/o with psychotic features and plans to titrate Risperdal and to begin a taper of  Ativan  and to begin a pt trial of evidence based mood stabilizer Depakote ER  to alleviate the pt's severe and functionally impairing symptoms of a bipolar diathesis.  This writer also offered the pt and her family psychoeducational information related to bipolar d/o and treatment options including Risperdal and  this writer gave the pt written information related to bipolar d/o and Depakote as a treatment for this mood d/o.

## 2023-08-23 NOTE — BH INPATIENT PSYCHIATRY DISCHARGE NOTE - OTHER PAST PSYCHIATRIC HISTORY (INCLUDE DETAILS REGARDING ONSET, COURSE OF ILLNESS, INPATIENT/OUTPATIENT TREATMENT)
Per EMR, "PPHx of Depression, 1 prior hospitalization at [facility] a few years ago for depression". Pt discussed a long hx of depression, since her teens. Pt discussed that in her 50s, her depression had been worse due to a failed adoption attempt.

## 2023-08-23 NOTE — BH INPATIENT PSYCHIATRY PROGRESS NOTE - NSBHFUPINTERVALCCFT_PSY_A_CORE
" I am feeling much better. I am very happy to be going home soon and seeing my doctor and my therapist again."    Nutrition consult note appreciated  on 8/16/23 due to pt DM 2 and due to pt poor nutrition with BMI = 19. Pt diagnosed with severe protein-calorie malnutrition.    Plan for Ensure max BID . MVI with minerals, Thiamine 100 mg po q am                On 8/21/23 this writer spoke by phone  with the  pt's sister Hortensia   ( tel [phone]) who had called writer  to review and to update the pt's h/o affective psychosis with evidence consistent with a pt manic episode with psychosis in July of 2023 culminating in the pt's suicide attempt via hanging prior to admission to hospital. 	Discussion with pt and her family as to likely pt diagnosis of a bipolar d/o with psychotic features and plans to titrate Risperdal and to begin a taper of  Ativan  and to begin a pt trial of evidence based mood stabilizer Depakote ER  to alleviate the pt's severe and functionally impairing symptoms of a bipolar diathesis.  This writer also offered the pt and her family psychoeducational information related to bipolar d/o and treatment options including Risperdal and  this writer gave the pt written information related to bipolar d/o and Depakote as a treatment for this mood d/o.

## 2023-08-24 VITALS — TEMPERATURE: 98 F | OXYGEN SATURATION: 99 % | RESPIRATION RATE: 17 BRPM

## 2023-08-24 PROCEDURE — 99239 HOSP IP/OBS DSCHRG MGMT >30: CPT

## 2023-08-24 RX ADMIN — Medication 100 MILLIGRAM(S): at 09:02

## 2023-08-24 RX ADMIN — Medication 50 MILLIGRAM(S): at 02:49

## 2023-08-24 RX ADMIN — RISPERIDONE 0.5 MILLIGRAM(S): 4 TABLET ORAL at 09:01

## 2023-08-24 RX ADMIN — METFORMIN HYDROCHLORIDE 1000 MILLIGRAM(S): 850 TABLET ORAL at 09:02

## 2023-08-24 RX ADMIN — Medication 1 MILLIGRAM(S): at 09:02

## 2023-08-24 RX ADMIN — Medication 1 TABLET(S): at 09:02

## 2023-08-24 RX ADMIN — Medication 1 MILLIGRAM(S): at 02:49

## 2023-08-24 NOTE — BH INPATIENT PSYCHIATRY PROGRESS NOTE - NSTXCOPEDATETRGT_PSY_ALL_CORE
28-Aug-2023
25-Aug-2023
19-Aug-2023
19-Aug-2023
25-Aug-2023
28-Aug-2023
25-Aug-2023
19-Aug-2023
28-Aug-2023
28-Aug-2023

## 2023-08-24 NOTE — BH INPATIENT PSYCHIATRY PROGRESS NOTE - NSTXDISORGDATETRGT_PSY_ALL_CORE
21-Aug-2023
21-Aug-2023
20-Aug-2023
20-Aug-2023
28-Aug-2023
21-Aug-2023
28-Aug-2023
21-Aug-2023
28-Aug-2023
21-Aug-2023

## 2023-08-24 NOTE — BH INPATIENT PSYCHIATRY PROGRESS NOTE - NSTXDISORGPROGRES_PSY_ALL_CORE
No Change
No Change
Improving
No Change
Improving
Met - goal discontinued
No Change

## 2023-08-24 NOTE — BH TREATMENT PLAN - NSTXPLANTHERAPYSESSIONSFT_PSY_ALL_CORE
08-13-23  Type of therapy: N/A  Type of session: N/A  Level of patient participation: Resistance to participation  Duration of participation: 0  Therapy conducted by: N/A  Therapy Summary: Patient did not attend any therapy groups offered despite encouragement.    08-14-23  Type of therapy: Mindfulness  Type of session: Group  Level of patient participation: Participated with encouragement  Duration of participation: 45 minutes  Therapy conducted by: Psych rehab  Therapy Summary: Patient participated in Morning Gratitude Meditation Group  
  08-18-23  Type of therapy: Cognitive behavior therapy, Psychoeducation  Type of session: Group  Level of patient participation: Attentive, Engaged, Participated with encouragement  Duration of participation: 45 minutes  Therapy conducted by: Other (lorraine), [facility]  Therapy Summary: Patient preoccupied and anxious today with ruminating thoughts especially about her discharge plan.  Patient restless, repetitive and has some underlying paranoia. Does not believe it will be possible to get a follow up appointment within 5 days of discharge.  Would not respond to reassurance no matter how many times it was repeated. Tense and anxious affect with limited ability to concentrate.    08-18-23  Type of therapy: Health and fitness  Type of session: Group  Level of patient participation: Engaged  Duration of participation: 30 minutes  Therapy conducted by: Psych rehab  Therapy Summary: Patient participated in Afternoon Open Exercise Group. Patient had complaints that she was cold during the Group and was offered opportunity to grab an article of clothing to keep warm. Patient declined stating, "it's just me."    08-21-23  Type of therapy: Creative arts therapy, Health and fitness, Self esteem  Type of session: Group  Level of patient participation: Participated with encouragement  Duration of participation: 60 minutes  Therapy conducted by: Psych rehab  Therapy Summary: Patient participated in Morning Open Art Group, Afternoon Self Esteem Group, and Afternoon Open Gym. Patient required reassurance and frequent support from the writer to engage in the group activities    08-23-23  Type of therapy: Coping skills, Creative arts therapy  Type of session: Group  Level of patient participation: Engaged, Participates  Duration of participation: 60 minutes  Therapy conducted by: Psych rehab  Therapy Summary: Patient attended group therapy and recreation today.  
  08-18-23  Type of therapy: Cognitive behavior therapy, Psychoeducation  Type of session: Group  Level of patient participation: Attentive, Engaged, Participated with encouragement  Duration of participation: 45 minutes  Therapy conducted by: Other (lorraine), [facility]  Therapy Summary: Patient preoccupied and anxious today with ruminating thoughts especially about her discharge plan.  Patient restless, repetitive and has some underlying paranoia. Does not believe it will be possible to get a follow up appointment within 5 days of discharge.  Would not respond to reassurance no matter how many times it was repeated. Tense and anxious affect with limited ability to concentrate.    08-18-23  Type of therapy: Health and fitness  Type of session: Group  Level of patient participation: Engaged  Duration of participation: 30 minutes  Therapy conducted by: Psych rehab  Therapy Summary: Patient participated in Afternoon Open Exercise Group. Patient had complaints that she was cold during the Group and was offered opportunity to grab an article of clothing to keep warm. Patient declined stating, "it's just me."    08-21-23  Type of therapy: Creative arts therapy, Health and fitness, Self esteem  Type of session: Group  Level of patient participation: Participated with encouragement  Duration of participation: 60 minutes  Therapy conducted by: Psych rehab  Therapy Summary: Patient participated in Morning Open Art Group, Afternoon Self Esteem Group, and Afternoon Open Gym. Patient required reassurance and frequent support from the writer to engage in the group activities

## 2023-08-24 NOTE — BH TREATMENT PLAN - NSCMSPTSTRENGTHS_PSY_ALL_CORE
Assertive/Compliance to treatment/Courageous/Expressive of emotions/Yue/spirituality/Financial stability/Highly motivated for treatment/Intact family/Intelligence/Motivated/Resourceful/Self-reliant/Sense of Humor/Strong support system/Supportive family
Expressive of emotions/Yue/spirituality/Financial stability/Intelligence/Resourceful/Strong support system/Supportive family
Assertive/Compliance to treatment/Courageous/Expressive of emotions/Yue/spirituality/Financial stability/Highly motivated for treatment/Intact family/Intelligence/Motivated/Resourceful/Strong support system/Supportive family

## 2023-08-24 NOTE — BH INPATIENT PSYCHIATRY PROGRESS NOTE - NSICDXBHPRIMARYDX_PSY_ALL_CORE
Major depression with psychotic features   F32.3  

## 2023-08-24 NOTE — BH INPATIENT PSYCHIATRY PROGRESS NOTE - NSTXDCOTHRDATETRGT_PSY_ALL_CORE
26-Aug-2023
26-Aug-2023
19-Aug-2023
30-Aug-2023
19-Aug-2023
30-Aug-2023

## 2023-08-24 NOTE — BH INPATIENT PSYCHIATRY PROGRESS NOTE - NSTXCOPEDATEEST_PSY_ALL_CORE
21-Aug-2023
21-Aug-2023
12-Aug-2023
21-Aug-2023
19-Aug-2023
21-Aug-2023

## 2023-08-24 NOTE — BH TREATMENT PLAN - NSDCCRITERIA_PSY_ALL_CORE
pt with denial of any suicidal ideation intent , stable mood and affect
 pt acute resolution of affective volatility and psychosis   resolution of SI and strong implementation of pt safety planning and coping skills
 pt acute resolution of affective volatility and psychosis   resolution of SI and strong implementation of pt safety planning and coping skills

## 2023-08-24 NOTE — BH TREATMENT PLAN - NSBHPRIMARYDX_PSY_ALL_CORE
Major depression with psychotic features    

## 2023-08-24 NOTE — BH INPATIENT PSYCHIATRY PROGRESS NOTE - OTHER
" I feel very good." becoming more euthymic speech is rapid but no longer pressured . Speech is articulate and  goal directed  selectively social with other female peers less labile and keating in range and intensity

## 2023-08-24 NOTE — BH INPATIENT PSYCHIATRY PROGRESS NOTE - NSICDXBHSECONDARYDX_PSY_ALL_CORE
Dysthymia   F34.1  

## 2023-08-24 NOTE — BH INPATIENT PSYCHIATRY PROGRESS NOTE - NSTXDISORGDATEEST_PSY_ALL_CORE
21-Aug-2023
14-Aug-2023
11-Aug-2023
14-Aug-2023
11-Aug-2023
14-Aug-2023
21-Aug-2023
21-Aug-2023

## 2023-08-24 NOTE — BH INPATIENT PSYCHIATRY PROGRESS NOTE - NSBHMSEMUSCLE_PSY_A_CORE
Unable to assess
Normal muscle tone/strength
Unable to assess
Normal muscle tone/strength
Unable to assess
Normal muscle tone/strength

## 2023-08-24 NOTE — BH TREATMENT PLAN - NSTXDISORGINTERRN_PSY_ALL_CORE
Establish trust/therapeutic rapport to encourage ventilation of feelings  Provide emotional support  Assess mood Q shift, document and report changes  Encourage medication compliance, provide education, and monitor effects  Encourage participation in unit activities with emphasis on coping/stress management  Reality orient as needed  Provide consistent direction

## 2023-08-24 NOTE — BH TREATMENT PLAN - NSTXDCOTHRINTERSW_PSY_ALL_CORE
SW met with pt to complete assessment. SW role explained, pt expressed understanding. Pt would like to return to Dr. Mckeon at time of dc.
SW received call from Dr. Mckeon who provided patient with an aftercare appointment on 8/26/23 at 4:20 PM via video conference. SW to update patient and sister.
Pt to be discharged next week. Pt requested to speak to SW regarding her dcp. Pt wants to return to Dr. Mckeon for medication management and Dr. Schulte for psychotherapy, pt signed consents. Pt very dc focused and worried about her next steps following dc and that she will get transferred to another hospital. SW explained that so far dcp is for her to return home and her previous providers. Pt expressed understanding.

## 2023-08-24 NOTE — BH INPATIENT PSYCHIATRY PROGRESS NOTE - NSTXSUICIDINTERMD_PSY_ALL_CORE
1. Informed consent obtained from the pt for a trial of Risperdal now titrated to 2 mg po qhs to decrease daytime sedation  2.Ativan 1 mg po tid standing dose to alleviate severe and functionally impairing anxiety in the context of severe affective psychosis  3.Ongoing staff and pt family support and encouragement  4.Pt gave writer consent to speak with pt's family and with pt's outpatient Dr Mckeon in [place] to gather further pt clinical history to aid in pt treatment and disposition planning  5.Safety planning ongoing  6. To consider restart of SSRI Lexapro once the pt's anxiety and thought disorganization have begun to resolve 
1. Informed consent obtained from the pt for a trial of Risperdal now titrated to 2 mg po qhs to decrease daytime sedation  2.Ativan 1 mg po tid standing dose to alleviate severe and functionally impairing anxiety in the context of severe affective psychosis  3.Ongoing staff and pt family support and encouragement  4.Pt gave writer consent to speak with pt's family and with pt's outpatient Dr Mckeon in [place] to gather further pt clinical history to aid in pt treatment and disposition planning  5.Safety planning ongoing  6. To consider restart of SSRI Lexapro once the pt's anxiety and thought disorganization have begun to resolve 
1. Informed consent obtained from the pt for a trial of Risperdal now titrated to 2 mg po qhs to decrease daytime sedation  2.Ativan 1 mg po tid standing dose to alleviate severe and functionally impairing anxiety in the context of severe affective psychosis  3.Ongoing staff and pt family support and encouragement  4.Pt gave writer consent to speak with pt's family and with pt's outpatient Dr Mckeon in [place] to gather further pt clinical history to aid in pt treatment and disposition planning  5.Safety planning ongoing  6. To consider restart of SSRI Lexapro once the pt's anxiety and thought disorganization have begun to resolve 
1. Informed consent obtained from the pt for a trial of Risperdal now titrated to 2 mg po qhs to decrease daytime sedation  2.Ativan 1 mg po tid standing dose to alleviate severe and functionally impairing anxiety in the context of severe affective psychosis  3.Ongoing staff and pt family support and encouragement  4.Pt gave writer consent to speak with pt's family and with pt's outpatient Dr Mckeon in [place] to gather further pt clinical history to aid in pt treatment and disposition planning  5.Safety planning ongoing  6. To consider restart of SSRI Lexapro once the pt's anxiety and thought disorganization have begun to resolve 
1. Informed consent obtained from the pt for a trial of Risperdal now titrated to  0.5 mg po q am and 3 mg po qhs ( affective psychosis)  2.Ativan 1 mg po tid standing dose to alleviate severe and functionally impairing anxiety in the context of severe affective psychosis  3. Pt informed consent obtained for a trial of Depakote ER now titrated to 500 mg po qhs . Depakote level = 70 ug/ml ( 8/21/23)   4.Ongoing staff and pt family support and encouragement  4.Pt gave writer consent to speak with pt's family and with pt's outpatient Dr Mckeon  (tel [phone]) in [place] to gather further pt clinical history to aid in pt treatment and disposition planning  5.Safety planning ongoing
1. Informed consent obtained from the pt for a trial of Risperdal now titrated to  0.5 mg po q am and 3 mg po qhs ( affective psychosis)  2.Ativan 1 mg po tid standing dose to alleviate severe and functionally impairing anxiety in the context of severe affective psychosis  3. Pt informed consent obtained for a trial of Depakote ER now titrated to 500 mg po qhs . Depakote level = 70 ug/ml ( 8/21/23)   4.Ongoing staff and pt family support and encouragement  4.Pt gave writer consent to speak with pt's family and with pt's outpatient Dr Mckeon  (tel [phone]) in [place] to gather further pt clinical history to aid in pt treatment and disposition planning  5.Safety planning ongoing
1. Informed consent obtained from the pt for a trial of Risperdal now titrated to 2 mg po qhs to decrease daytime sedation  2.Ativan 1 mg po tid standing dose to alleviate severe and functionally impairing anxiety in the context of severe affective psychosis  3.Ongoing staff and pt family support and encouragement  4.Pt gave writer consent to speak with pt's family and with pt's outpatient Dr Mckeon in [place] to gather further pt clinical history to aid in pt treatment and disposition planning  5.Safety planning ongoing  6. To consider restart of SSRI Lexapro once the pt's anxiety and thought disorganization have begun to resolve 
1. Informed consent obtained from the pt for a trial of Risperdal now titrated to  0.5 mg po q am and 3 mg po qhs ( affective psychosis)  2.Ativan 1 mg po tid standing dose to alleviate severe and functionally impairing anxiety in the context of severe affective psychosis  3. Pt informed consent obtained for a trial of Depakote ER now titrated to 500 mg po qhs . Depakote level = 70 ug/ml ( 8/21/23)   4.Ongoing staff and pt family support and encouragement  4.Pt gave writer consent to speak with pt's family and with pt's outpatient Dr Mckeon  (tel [phone]) in [place] to gather further pt clinical history to aid in pt treatment and disposition planning  5.Safety planning ongoing

## 2023-08-24 NOTE — BH INPATIENT PSYCHIATRY PROGRESS NOTE - NSBHMSEPERCEPT_PSY_A_CORE
Visual hallucinations
Visual hallucinations
No abnormalities
Visual hallucinations

## 2023-08-24 NOTE — BH INPATIENT PSYCHIATRY PROGRESS NOTE - NSTXDCOTHRDATEEST_PSY_ALL_CORE
12-Aug-2023
19-Aug-2023
19-Aug-2023
12-Aug-2023
23-Aug-2023
23-Aug-2023
12-Aug-2023

## 2023-08-24 NOTE — BH INPATIENT PSYCHIATRY PROGRESS NOTE - NSBHMSEKNOWHOW_PSY_ALL_CORE
Educational attainment/Vocabulary
Vocabulary
Educational attainment/Vocabulary
Vocabulary
Educational attainment/Vocabulary

## 2023-08-24 NOTE — BH INPATIENT PSYCHIATRY PROGRESS NOTE - NSBHFUPINTERVALCCFT_PSY_A_CORE
" I am feeling much better. "    Nutrition consult note appreciated  on 8/16/23 due to pt DM 2 and due to pt poor nutrition with BMI = 19. Pt diagnosed with severe protein-calorie malnutrition.    Plan for Ensure max BID . MVI with minerals, Thiamine 100 mg po q am                On 8/21/23 this writer spoke by phone  with the  pt's sister Hortensia   ( tel [phone]) who had called writer  to review and to update the pt's h/o affective psychosis with evidence consistent with a pt manic episode with psychosis in July of 2023 culminating in the pt's suicide attempt via hanging prior to admission to hospital. 	Discussion with pt and her family as to likely pt diagnosis of a bipolar d/o with psychotic features and plans to titrate Risperdal and to begin a taper of  Ativan  and to begin a pt trial of evidence based mood stabilizer Depakote ER  to alleviate the pt's severe and functionally impairing symptoms of a bipolar diathesis.  This writer also offered the pt and her family psychoeducational information related to bipolar d/o and treatment options including Risperdal and  this writer gave the pt written information related to bipolar d/o and Depakote as a treatment for this mood d/o.

## 2023-08-24 NOTE — BH INPATIENT PSYCHIATRY PROGRESS NOTE - NSBHMSEMOVE_PSY_A_CORE
Unable to assess
No abnormal movements
Unable to assess
No abnormal movements
No abnormal movements

## 2023-08-24 NOTE — BH INPATIENT PSYCHIATRY PROGRESS NOTE - NSTXDISORGINTERMD_PSY_ALL_CORE
1. Informed consent obtained from the pt for a trial of Risperdal now titrated to 2 mg po qhs to decrease daytime sedation  2.Ativan 1 mg po tid standing dose to alleviate severe and functionally impairing anxiety in the context of severe affective psychosis  3.Ongoing staff and pt family support and encouragement  4.Pt gave writer consent to speak with pt's family and with pt's outpatient Dr Mckeon in [place] to gather further pt clinical history to aid in pt treatment and disposition planning  5.Safety planning ongoing
1. Informed consent obtained from the pt for a trial of Risperdal now titrated to 2 mg po qhs to decrease daytime sedation  2.Ativan 1 mg po tid standing dose to alleviate severe and functionally impairing anxiety in the context of severe affective psychosis  3.Ongoing staff and pt family support and encouragement  4.Pt gave writer consent to speak with pt's family and with pt's outpatient Dr Mckeon in [place] to gather further pt clinical history to aid in pt treatment and disposition planning  5.Safety planning ongoing
1. Informed consent obtained from the pt for a trial of Risperdal now titrated to  0.5 mg po q am and 3 mg po qhs ( affective psychosis)  2.Ativan 1 mg po tid standing dose to alleviate severe and functionally impairing anxiety in the context of severe affective psychosis  3. Pt informed consent obtained for a trial of Depakote ER now titrated to 500 mg po qhs . Depakote level = 70 ug/ml ( 8/21/23)   4.Ongoing staff and pt family support and encouragement  4.Pt gave writer consent to speak with pt's family and with pt's outpatient Dr Mckeon  (tel [phone]) in [place] to gather further pt clinical history to aid in pt treatment and disposition planning  5.Safety planning ongoing
1. Informed consent obtained from the pt for a trial of Risperdal now titrated to 2 mg po qhs to decrease daytime sedation  2.Ativan 1 mg po tid standing dose to alleviate severe and functionally impairing anxiety in the context of severe affective psychosis  3.Ongoing staff and pt family support and encouragement  4.Pt gave writer consent to speak with pt's family and with pt's outpatient Dr Mckeon in [place] to gather further pt clinical history to aid in pt treatment and disposition planning  5.Safety planning ongoing
1. Informed consent obtained from the pt for a trial of Risperdal now titrated to  0.5 mg po q am and 3 mg po qhs ( affective psychosis)  2.Ativan 1 mg po tid standing dose to alleviate severe and functionally impairing anxiety in the context of severe affective psychosis  3. Pt informed consent obtained for a trial of Depakote ER now titrated to 500 mg po qhs . Depakote level = 70 ug/ml ( 8/21/23)   4.Ongoing staff and pt family support and encouragement  4.Pt gave writer consent to speak with pt's family and with pt's outpatient Dr Mckeon  (tel [phone]) in [place] to gather further pt clinical history to aid in pt treatment and disposition planning  5.Safety planning ongoing
1. Informed consent obtained from the pt for a trial of Risperdal now titrated to 2 mg po qhs to decrease daytime sedation  2.Ativan 1 mg po tid standing dose to alleviate severe and functionally impairing anxiety in the context of severe affective psychosis  3.Ongoing staff and pt family support and encouragement  4.Pt gave writer consent to speak with pt's family and with pt's outpatient Dr Mckeon in [place] to gather further pt clinical history to aid in pt treatment and disposition planning  5.Safety planning ongoing
1. Informed consent obtained from the pt for a trial of Risperdal now titrated to  0.5 mg po q am and 3 mg po qhs ( affective psychosis)  2.Ativan 1 mg po tid standing dose to alleviate severe and functionally impairing anxiety in the context of severe affective psychosis  3. Pt informed consent obtained for a trial of Depakote ER now titrated to 500 mg po qhs . Depakote level = 70 ug/ml ( 8/21/23)   4.Ongoing staff and pt family support and encouragement  4.Pt gave writer consent to speak with pt's family and with pt's outpatient Dr Mckeon  (tel [phone]) in [place] to gather further pt clinical history to aid in pt treatment and disposition planning  5.Safety planning ongoing
1. Informed consent obtained from the pt for a trial of Risperdal now titrated to 2 mg po qhs to decrease daytime sedation  2.Ativan 1 mg po tid standing dose to alleviate severe and functionally impairing anxiety in the context of severe affective psychosis  3.Ongoing staff and pt family support and encouragement  4.Pt gave writer consent to speak with pt's family and with pt's outpatient Dr Mckeon in [place] to gather further pt clinical history to aid in pt treatment and disposition planning  5.Safety planning ongoing

## 2023-08-24 NOTE — BH TREATMENT PLAN - NSTXSUICIDINTERMD_PSY_ALL_CORE
1. Informed consent obtained from the pt for a trial of Risperdal now titrated to  0.5 mg po q am and 3 mg po qhs ( affective psychosis)  2.Ativan 1 mg po lowered to bid standing dose to alleviate severe and functionally impairing anxiety in the context of severe affective psychosis  3. Pt informed consent obtained for a trial of Depakote ER now titrated to 500 mg po qhs . Depakote level = 70 ug/ml ( 8/21/23)   4.Ongoing staff and pt family support and encouragement  4.Pt gave writer consent to speak with pt's family and with pt's outpatient Dr Mckeon  (tel [phone]) in [place] to gather further pt clinical history to aid in pt treatment and disposition planning  5.Safety planning ongoing with pt DC home from hospital on 8/24/23 and follow up with psychologist Dr. Condon on 8/24/23 at 4 pm  6.Pt to follow up with Dr. Mckeon outpatient psychiatrist as well with appointment TBD  7.Pt has PCP follow up with Dr. New on 8/29/23 at 9 am
1. Informed consent obtained from the pt for a trial of Risperdal now titrated to 2 mg po qhs to decrease daytime sedation  2.Ativan 1 mg po tid standing dose to alleviate severe and functionally impairing anxiety in the context of severe affective psychosis  3.Ongoing staff and pt family support and encouragement  4.Pt gave writer consent to speak with pt's family and with pt's outpatient Dr Mckeon in [place] to gather further pt clinical history to aid in pt treatment and disposition planning  5.Safety planning ongoing  6. To consider restart of SSRI Lexapro once the pt's anxiety and thought disorganization have begun to resolve 
1. Informed consent obtained from the pt for a trial of Risperdal now titrated to  0.5 mg po q am and 3 mg po qhs ( affective psychosis)  2.Ativan 1 mg po tid standing dose to alleviate severe and functionally impairing anxiety in the context of severe affective psychosis  3. Pt informed consent obtained for a trial of Depakote ER now titrated to 500 mg po qhs . Depakote level = 70 ug/ml ( 8/21/23)   4.Ongoing staff and pt family support and encouragement  4.Pt gave writer consent to speak with pt's family and with pt's outpatient Dr Mckeon  (tel [phone]) in [place] to gather further pt clinical history to aid in pt treatment and disposition planning  5.Safety planning ongoing

## 2023-08-24 NOTE — BH TREATMENT PLAN - NSTXDISORGINTERMD_PSY_ALL_CORE
1. Informed consent obtained from the pt for a trial of Risperdal now titrated to  0.5 mg po q am and 3 mg po qhs ( affective psychosis)  2.Ativan 1 mg po lowered to bid standing dose to alleviate severe and functionally impairing anxiety in the context of severe affective psychosis  3. Pt informed consent obtained for a trial of Depakote ER now titrated to 500 mg po qhs . Depakote level = 70 ug/ml ( 8/21/23)   4.Ongoing staff and pt family support and encouragement  4.Pt gave writer consent to speak with pt's family and with pt's outpatient Dr Mckeon  (tel [phone]) in [place] to gather further pt clinical history to aid in pt treatment and disposition planning  5.Safety planning ongoing with pt DC home from hospital on 8/24/23 and follow up with psychologist Dr. Condon on 8/24/23 at 4 pm  6.Pt to follow up with Dr. Mckeon outpatient psychiatrist as well with appointment TBD  7.Pt has PCP follow up with Dr. New on 8/29/23 at 9 am
1. Informed consent obtained from the pt for a trial of Risperdal now titrated to 2 mg po qhs to decrease daytime sedation  2.Ativan 1 mg po tid standing dose to alleviate severe and functionally impairing anxiety in the context of severe affective psychosis  3.Ongoing staff and pt family support and encouragement  4.Pt gave writer consent to speak with pt's family and with pt's outpatient Dr Mckeon in [place] to gather further pt clinical history to aid in pt treatment and disposition planning  5.Safety planning ongoing
1. Informed consent obtained from the pt for a trial of Risperdal now titrated to  0.5 mg po q am and 3 mg po qhs ( affective psychosis)  2.Ativan 1 mg po tid standing dose to alleviate severe and functionally impairing anxiety in the context of severe affective psychosis  3. Pt informed consent obtained for a trial of Depakote ER now titrated to 500 mg po qhs . Depakote level = 70 ug/ml ( 8/21/23)   4.Ongoing staff and pt family support and encouragement  4.Pt gave writer consent to speak with pt's family and with pt's outpatient Dr Mckeon  (tel [phone]) in [place] to gather further pt clinical history to aid in pt treatment and disposition planning  5.Safety planning ongoing

## 2023-08-24 NOTE — BH INPATIENT PSYCHIATRY PROGRESS NOTE - CURRENT MEDICATION
MEDICATIONS  (STANDING):  atorvastatin 10 milliGRAM(s) Oral at bedtime  LORazepam     Tablet 1 milliGRAM(s) Oral three times a day  metFORMIN 1000 milliGRAM(s) Oral two times a day  risperiDONE   Tablet 2 milliGRAM(s) Oral at bedtime    MEDICATIONS  (PRN):  diphenhydrAMINE 50 milliGRAM(s) Oral every 6 hours PRN Extrapyramidal prophylaxis  diphenhydrAMINE Injectable 50 milliGRAM(s) IntraMuscular once PRN Extrapyramidal prophylaxis  haloperidol     Tablet 5 milliGRAM(s) Oral every 6 hours PRN moderate psychotic agitation  haloperidol    Injectable 5 milliGRAM(s) IntraMuscular Once PRN severe psychotic agitation  LORazepam     Tablet 2 milliGRAM(s) Oral every 6 hours PRN moderate psychotic anxiety  LORazepam   Injectable 2 milliGRAM(s) IntraMuscular Once PRN severe psychotic anxiety  traZODone 75 milliGRAM(s) Oral at bedtime PRN insomnia  
MEDICATIONS  (STANDING):  atorvastatin 10 milliGRAM(s) Oral at bedtime  divalproex  milliGRAM(s) Oral at bedtime  LORazepam     Tablet 1 milliGRAM(s) Oral four times a day  metFORMIN 1000 milliGRAM(s) Oral two times a day  multivitamin/minerals 1 Tablet(s) Oral daily  risperiDONE   Tablet 3 milliGRAM(s) Oral at bedtime  thiamine 100 milliGRAM(s) Oral daily    MEDICATIONS  (PRN):  diphenhydrAMINE 50 milliGRAM(s) Oral every 6 hours PRN Extrapyramidal prophylaxis  diphenhydrAMINE Injectable 50 milliGRAM(s) IntraMuscular once PRN Extrapyramidal prophylaxis  haloperidol     Tablet 5 milliGRAM(s) Oral every 6 hours PRN moderate psychotic agitation  haloperidol    Injectable 5 milliGRAM(s) IntraMuscular Once PRN severe psychotic agitation  LORazepam     Tablet 1 milliGRAM(s) Oral every 6 hours PRN moderate anxiety due to affective psychosis  LORazepam   Injectable 2 milliGRAM(s) IntraMuscular Once PRN severe psychotic anxiety  traZODone 75 milliGRAM(s) Oral at bedtime PRN insomnia  
MEDICATIONS  (STANDING):  atorvastatin 10 milliGRAM(s) Oral at bedtime  divalproex  milliGRAM(s) Oral at bedtime  LORazepam     Tablet 1 milliGRAM(s) Oral two times a day  metFORMIN 1000 milliGRAM(s) Oral two times a day  multivitamin/minerals 1 Tablet(s) Oral daily  risperiDONE   Tablet 0.5 milliGRAM(s) Oral daily  risperiDONE   Tablet 3 milliGRAM(s) Oral at bedtime  thiamine 100 milliGRAM(s) Oral daily    MEDICATIONS  (PRN):  diphenhydrAMINE 50 milliGRAM(s) Oral every 6 hours PRN Extrapyramidal prophylaxis  diphenhydrAMINE Injectable 50 milliGRAM(s) IntraMuscular once PRN Extrapyramidal prophylaxis  haloperidol     Tablet 5 milliGRAM(s) Oral every 6 hours PRN moderate psychotic agitation  haloperidol    Injectable 5 milliGRAM(s) IntraMuscular Once PRN severe psychotic agitation  LORazepam     Tablet 1 milliGRAM(s) Oral every 6 hours PRN moderate anxiety due to affective psychosis  LORazepam   Injectable 2 milliGRAM(s) IntraMuscular Once PRN severe psychotic anxiety  traZODone 100 milliGRAM(s) Oral at bedtime PRN Insomnia  
MEDICATIONS  (STANDING):  atorvastatin 10 milliGRAM(s) Oral at bedtime  escitalopram 10 milliGRAM(s) Oral daily  metFORMIN 1000 milliGRAM(s) Oral two times a day  risperiDONE   Tablet 1 milliGRAM(s) Oral two times a day    MEDICATIONS  (PRN):  diphenhydrAMINE 50 milliGRAM(s) Oral every 6 hours PRN Extrapyramidal prophylaxis  diphenhydrAMINE Injectable 50 milliGRAM(s) IntraMuscular once PRN Extrapyramidal prophylaxis  haloperidol     Tablet 5 milliGRAM(s) Oral every 6 hours PRN moderate psychotic agitation  haloperidol    Injectable 5 milliGRAM(s) IntraMuscular Once PRN severe psychotic agitation  LORazepam     Tablet 2 milliGRAM(s) Oral every 6 hours PRN moderate psychotic anxiety  LORazepam   Injectable 2 milliGRAM(s) IntraMuscular Once PRN severe psychotic anxiety  traZODone 75 milliGRAM(s) Oral at bedtime PRN insomnia  
MEDICATIONS  (STANDING):  atorvastatin 10 milliGRAM(s) Oral at bedtime  LORazepam     Tablet 1 milliGRAM(s) Oral four times a day  metFORMIN 1000 milliGRAM(s) Oral two times a day  multivitamin/minerals 1 Tablet(s) Oral daily  risperiDONE   Tablet 3 milliGRAM(s) Oral at bedtime  thiamine 100 milliGRAM(s) Oral daily    MEDICATIONS  (PRN):  diphenhydrAMINE 50 milliGRAM(s) Oral every 6 hours PRN Extrapyramidal prophylaxis  diphenhydrAMINE Injectable 50 milliGRAM(s) IntraMuscular once PRN Extrapyramidal prophylaxis  haloperidol     Tablet 5 milliGRAM(s) Oral every 6 hours PRN moderate psychotic agitation  haloperidol    Injectable 5 milliGRAM(s) IntraMuscular Once PRN severe psychotic agitation  LORazepam     Tablet 2 milliGRAM(s) Oral every 6 hours PRN moderate psychotic anxiety  LORazepam   Injectable 2 milliGRAM(s) IntraMuscular Once PRN severe psychotic anxiety  traZODone 75 milliGRAM(s) Oral at bedtime PRN insomnia  
MEDICATIONS  (STANDING):  atorvastatin 10 milliGRAM(s) Oral at bedtime  divalproex  milliGRAM(s) Oral at bedtime  LORazepam     Tablet 1 milliGRAM(s) Oral four times a day  metFORMIN 1000 milliGRAM(s) Oral two times a day  multivitamin/minerals 1 Tablet(s) Oral daily  risperiDONE   Tablet 3 milliGRAM(s) Oral at bedtime  thiamine 100 milliGRAM(s) Oral daily    MEDICATIONS  (PRN):  diphenhydrAMINE 50 milliGRAM(s) Oral every 6 hours PRN Extrapyramidal prophylaxis  diphenhydrAMINE Injectable 50 milliGRAM(s) IntraMuscular once PRN Extrapyramidal prophylaxis  haloperidol     Tablet 5 milliGRAM(s) Oral every 6 hours PRN moderate psychotic agitation  haloperidol    Injectable 5 milliGRAM(s) IntraMuscular Once PRN severe psychotic agitation  LORazepam     Tablet 1 milliGRAM(s) Oral every 6 hours PRN moderate anxiety due to affective psychosis  LORazepam   Injectable 2 milliGRAM(s) IntraMuscular Once PRN severe psychotic anxiety  traZODone 75 milliGRAM(s) Oral at bedtime PRN insomnia  
MEDICATIONS  (STANDING):  atorvastatin 10 milliGRAM(s) Oral at bedtime  divalproex  milliGRAM(s) Oral at bedtime  LORazepam     Tablet 1 milliGRAM(s) Oral two times a day  metFORMIN 1000 milliGRAM(s) Oral two times a day  multivitamin/minerals 1 Tablet(s) Oral daily  risperiDONE   Tablet 3 milliGRAM(s) Oral at bedtime  risperiDONE   Tablet 0.5 milliGRAM(s) Oral daily  thiamine 100 milliGRAM(s) Oral daily  traZODone 100 milliGRAM(s) Oral at bedtime    MEDICATIONS  (PRN):  diphenhydrAMINE 50 milliGRAM(s) Oral every 6 hours PRN Extrapyramidal prophylaxis  diphenhydrAMINE Injectable 50 milliGRAM(s) IntraMuscular once PRN Extrapyramidal prophylaxis  haloperidol     Tablet 5 milliGRAM(s) Oral every 6 hours PRN moderate psychotic agitation  haloperidol    Injectable 5 milliGRAM(s) IntraMuscular Once PRN severe psychotic agitation  LORazepam     Tablet 1 milliGRAM(s) Oral every 6 hours PRN moderate anxiety due to affective psychosis  LORazepam   Injectable 2 milliGRAM(s) IntraMuscular Once PRN severe psychotic anxiety  
MEDICATIONS  (STANDING):  atorvastatin 10 milliGRAM(s) Oral at bedtime  divalproex  milliGRAM(s) Oral at bedtime  LORazepam     Tablet 1 milliGRAM(s) Oral three times a day  metFORMIN 1000 milliGRAM(s) Oral two times a day  multivitamin/minerals 1 Tablet(s) Oral daily  risperiDONE   Tablet 0.5 milliGRAM(s) Oral once  risperiDONE   Tablet 3 milliGRAM(s) Oral at bedtime  thiamine 100 milliGRAM(s) Oral daily    MEDICATIONS  (PRN):  diphenhydrAMINE 50 milliGRAM(s) Oral every 6 hours PRN Extrapyramidal prophylaxis  diphenhydrAMINE Injectable 50 milliGRAM(s) IntraMuscular once PRN Extrapyramidal prophylaxis  haloperidol     Tablet 5 milliGRAM(s) Oral every 6 hours PRN moderate psychotic agitation  haloperidol    Injectable 5 milliGRAM(s) IntraMuscular Once PRN severe psychotic agitation  LORazepam     Tablet 1 milliGRAM(s) Oral every 6 hours PRN moderate anxiety due to affective psychosis  LORazepam   Injectable 2 milliGRAM(s) IntraMuscular Once PRN severe psychotic anxiety  traZODone 100 milliGRAM(s) Oral at bedtime PRN Insomnia  
MEDICATIONS  (STANDING):  atorvastatin 10 milliGRAM(s) Oral at bedtime  divalproex  milliGRAM(s) Oral at bedtime  LORazepam     Tablet 1 milliGRAM(s) Oral two times a day  metFORMIN 1000 milliGRAM(s) Oral two times a day  multivitamin/minerals 1 Tablet(s) Oral daily  risperiDONE   Tablet 3 milliGRAM(s) Oral at bedtime  thiamine 100 milliGRAM(s) Oral daily    MEDICATIONS  (PRN):  diphenhydrAMINE 50 milliGRAM(s) Oral every 6 hours PRN Extrapyramidal prophylaxis  diphenhydrAMINE Injectable 50 milliGRAM(s) IntraMuscular once PRN Extrapyramidal prophylaxis  haloperidol     Tablet 5 milliGRAM(s) Oral every 6 hours PRN moderate psychotic agitation  haloperidol    Injectable 5 milliGRAM(s) IntraMuscular Once PRN severe psychotic agitation  LORazepam     Tablet 1 milliGRAM(s) Oral every 6 hours PRN moderate anxiety due to affective psychosis  LORazepam   Injectable 2 milliGRAM(s) IntraMuscular Once PRN severe psychotic anxiety  traZODone 100 milliGRAM(s) Oral at bedtime PRN Insomnia  
MEDICATIONS  (STANDING):  atorvastatin 10 milliGRAM(s) Oral at bedtime  LORazepam     Tablet 1 milliGRAM(s) Oral three times a day  metFORMIN 1000 milliGRAM(s) Oral two times a day  risperiDONE   Tablet 2 milliGRAM(s) Oral at bedtime    MEDICATIONS  (PRN):  diphenhydrAMINE 50 milliGRAM(s) Oral every 6 hours PRN Extrapyramidal prophylaxis  diphenhydrAMINE Injectable 50 milliGRAM(s) IntraMuscular once PRN Extrapyramidal prophylaxis  haloperidol     Tablet 5 milliGRAM(s) Oral every 6 hours PRN moderate psychotic agitation  haloperidol    Injectable 5 milliGRAM(s) IntraMuscular Once PRN severe psychotic agitation  LORazepam     Tablet 2 milliGRAM(s) Oral every 6 hours PRN moderate psychotic anxiety  LORazepam   Injectable 2 milliGRAM(s) IntraMuscular Once PRN severe psychotic anxiety  traZODone 75 milliGRAM(s) Oral at bedtime PRN insomnia

## 2023-08-24 NOTE — BH INPATIENT PSYCHIATRY PROGRESS NOTE - NSBHATTESTBILLING_PSY_A_CORE
59246-Snxdjqnyuu OBS or IP - moderate complexity OR 35-49 mins
30149-Nlhxqgoyuy OBS or IP - moderate complexity OR 35-49 mins
[id number]-Ppfgyvbkka OBS or IP - moderate complexity OR 35-49 mins
48897-Qsnlhdjusu OBS or IP - moderate complexity OR 35-49 mins
[id number]-Hyhdmkvids OBS or IP - moderate complexity OR 35-49 mins
81322-Cwrzinjryp OBS or IP - moderate complexity OR 35-49 mins
[id number]-Rkeqpejebe OBS or IP - moderate complexity OR 35-49 mins
[id number]-Tiugluoqfh OBS or IP - moderate complexity OR 35-49 mins
[id number]-Wwopgrduop OBS or IP - moderate complexity OR 35-49 mins

## 2023-08-24 NOTE — BH TREATMENT PLAN - NSTXCOPEINTERPR_PSY_ALL_CORE
Patient has been attending some group programming and observed to be improving minimally. Treatment goal to continue for 7 days.
Coping skills are improving and patient will be discharged today.
Encourage patient to actively participate in 1-2 psych rehab groups daily to improve coping skills.

## 2023-08-24 NOTE — BH INPATIENT PSYCHIATRY PROGRESS NOTE - NSBHCHARTREVIEWVS_PSY_A_CORE FT
Vital Signs Last 24 Hrs  T(C): 36.5 (08-24-23 @ 07:30), Max: 36.5 (08-24-23 @ 07:30)  T(F): 97.7 (08-24-23 @ 07:30), Max: 97.7 (08-24-23 @ 07:30)  HR: --  BP: --  BP(mean): --  RR: 17 (08-24-23 @ 07:30) (17 - 17)  SpO2: 99% (08-24-23 @ 07:30) (99% - 99%)    Orthostatic VS  08-24-23 @ 07:30  Lying BP: --/-- HR: --  Sitting BP: 120/72 HR: 79  Standing BP: 113/69 HR: 87  Site: upper right arm  Mode: electronic  Orthostatic VS  08-23-23 @ 07:55  Lying BP: --/-- HR: --  Sitting BP: 102/78 HR: 82  Standing BP: 107/57 HR: 93  Site: upper right arm  Mode: electronic

## 2023-08-24 NOTE — BH INPATIENT PSYCHIATRY PROGRESS NOTE - NSBHFUPINTERVALHXFT_PSY_A_CORE
Pt seen prior to her discharge home from hospital on 8/24/23.  The pt remains somewhat anxious and apprehensive but  but no longer overwhelmed by her worries and her thinking has become more organized.   The pt is tolerating Ensure to treat her sever protein -calorie malnutrition in the context of DM 2.   The pt. continues with evidence of  healing of the  pt's self inflicted shoe string ligature marks to her neck in a suicide gesture prior to her admission to hospital was apparent.     The pt is tolerating her currently newly begun med regimen of Risperdal with dose now titrated to   0.5 mg po q am and 3 mg po q hs  dose . The pt's Risperdal am dose increased to 1 mg will be lowered to 0.5 mg po q am as above due to pt reported increased daytime sedation. The pt will continue with HS Risperdal 3 mg po qhs and Depakote  mg po qhs, both well tolerated and with continuing good clinical effect.  Plan also discussed to  lower  standing Ativan  dose to   1 mg po  2 times a day  with DC  of prior SSRI Lexapro due to potential for exacerbation of the pt's already severe anxious agitation. The pt is tolerating her newly begun course of evidence based mood stabilizer  Depakote  mg with the pt's treatment plan discussed with pt to slowly titrate the dose to 500 mg po qhs and to check a Depakote level on 8/21/23 with goal of  alleviating  the pt's  severe affective instability.    The pt's Depakote level = 70 ug/ml  with platelets WNL.    	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 The pt is also amenable to resuming her outpatient therapy with Dr. Mckeon and with psychologist Dr Condon, the latter with whom the pt has a follow up appointment on 8/24/23 at 4 pm. The pt also was given a new appointment with her PCP Dr. New for 8/29/23 at 9 am for follow up of the pt's DM 2 and Hyperlipidemia.

## 2023-08-24 NOTE — BH INPATIENT PSYCHIATRY PROGRESS NOTE - NSBHMSEBODY_PSY_A_CORE
Average build/Underweight
Average build
Average build/Underweight

## 2023-08-24 NOTE — BH TREATMENT PLAN - NSPTSTATEDGOAL_PSY_ALL_CORE
Pt reports that she wants to return to Dr. Mckeon and wants to work on medication adjustments while on unit   " I don't want to have to be here for the rest of my life"
Pt reports that she wants to return to Dr. Mckeon and wants to work on medication adjustments while on unit   " I don't want to have to be here for the rest of my life or to be sent to a state hospital"
Pt reports that she wants to return to Dr. Mckeon and wants to work on medication adjustments while on unit   " I don't want to have to be here for the rest of my life or to be sent to a state hospital"

## 2023-08-24 NOTE — BH INPATIENT PSYCHIATRY PROGRESS NOTE - NSBHMSETHTPROC_PSY_A_CORE
Linear/Perseverative/Normal reasoning
Overinclusive/Tangential/Perseverative/Illogical/Impaired reasoning
Linear/Perseverative/Normal reasoning
Perseverative/Normal reasoning
Overinclusive/Tangential/Perseverative/Illogical/Impaired reasoning

## 2023-08-24 NOTE — BH INPATIENT PSYCHIATRY PROGRESS NOTE - NSDCCRITERIA_PSY_ALL_CORE
pt acute resolution of affective volatility and psychosis   resolution of SI and strong implementation of pt safety planning and coping skills
pt with denial of any suicidal ideation intent , stable mood and affect
 pt acute resolution of affective volatility and psychosis   resolution of SI and strong implementation of pt safety planning and coping skills
pt with denial of any suicidal ideation intent , stable mood and affect
 pt acute resolution of affective volatility and psychosis   resolution of SI and strong implementation of pt safety planning and coping skills

## 2023-08-24 NOTE — BH INPATIENT PSYCHIATRY PROGRESS NOTE - NSBHMSETHTCONTENT_PSY_A_CORE
Preoccupations/Ruminations/Hopelessness/Guilt/Suicidality
Preoccupations/Ruminations/Hopelessness/Guilt/Suicidality
Preoccupations/Ruminations
Preoccupations/Ruminations
Preoccupations/Ruminations/Hopelessness/Guilt/Suicidality
Preoccupations/Ruminations
Preoccupations/Ruminations/Hopelessness/Guilt/Suicidality

## 2023-08-24 NOTE — BH TREATMENT PLAN - NSTXCAREGIVERPARTICIPATE_PSY_P_CORE
Family/Caregiver participated in identification of needs/problems/goals for treatment/Family/Caregiver participated in defining interventions
Family/Caregiver participated in identification of needs/problems/goals for treatment/Family/Caregiver participated in defining interventions/Family/Caregiver participated in development of after care plan
Family/Caregiver participated in identification of needs/problems/goals for treatment/Family/Caregiver participated in defining interventions/Family/Caregiver participated in development of after care plan

## 2023-08-24 NOTE — BH INPATIENT PSYCHIATRY PROGRESS NOTE - NSBHMSEGAIT_PSY_A_CORE
Unable to assess
Unable to assess
Normal gait / station
Unable to assess
Normal gait / station
Normal gait / station
Unable to assess

## 2023-08-25 NOTE — CHART NOTE - NSCHARTNOTEFT_GEN_A_CORE
SW met with pt to review discharge plan in anticipation of her scheduled discharge today. SW discussed with pt aftercare appointments scheduled with Dr. Schulte on 8/24, Dr. Mckeon on 8/26, and Dr. New on 8/28. Pt remained agreeable to plan. Upon interview today pt denied depression, and SI/HI. Pt did endorse anxiety. SW discussed with pt that in the event of worsening symptoms including SI/HI pt should return to the closest hospital and call 911 for any safety concerns. Pt verbalized understanding. Pt was educated on the importance of following up with aftercare and not stopping any medications unless told to do so by her doctor. Pt did not verbalize any additional SW needs.
Arrived home, has medication and is not feeling suicidal.
Pt discharged to home today with aftercare scheduled with Dr. Schulte and Dr. Mckeon. Pt also discharged with PCP appt scheduled for Dr. Isaura New. SW faxed pt's DC summary to Dr. Mckeon and Dr. New, as well as emailed via secure email to Dr. Schulte for continuity of care. Pt's family provided transport to ensure safe arrival home.

## 2023-08-28 ENCOUNTER — APPOINTMENT (OUTPATIENT)
Dept: OBGYN | Facility: CLINIC | Age: 58
End: 2023-08-28
Payer: COMMERCIAL

## 2023-08-28 VITALS
SYSTOLIC BLOOD PRESSURE: 122 MMHG | BODY MASS INDEX: 20.61 KG/M2 | HEIGHT: 68 IN | DIASTOLIC BLOOD PRESSURE: 74 MMHG | WEIGHT: 136 LBS

## 2023-08-28 DIAGNOSIS — E43 UNSPECIFIED SEVERE PROTEIN-CALORIE MALNUTRITION: ICD-10-CM
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
## 2023-08-28 DIAGNOSIS — F31.9 BIPOLAR DISORDER, UNSPECIFIED: ICD-10-CM

## 2023-08-28 DIAGNOSIS — F34.1 DYSTHYMIC DISORDER: ICD-10-CM

## 2023-08-28 DIAGNOSIS — F31.2 BIPOLAR DISORDER, CURRENT EPISODE MANIC SEVERE WITH PSYCHOTIC FEATURES: ICD-10-CM

## 2023-08-28 DIAGNOSIS — Z79.84 LONG TERM (CURRENT) USE OF ORAL HYPOGLYCEMIC DRUGS: ICD-10-CM

## 2023-08-28 DIAGNOSIS — N63.20 UNSPECIFIED LUMP IN THE LEFT BREAST, UNSPECIFIED QUADRANT: ICD-10-CM

## 2023-08-28 DIAGNOSIS — Z91.51 PERSONAL HISTORY OF SUICIDAL BEHAVIOR: ICD-10-CM

## 2023-08-28 DIAGNOSIS — G47.00 INSOMNIA, UNSPECIFIED: ICD-10-CM

## 2023-08-28 DIAGNOSIS — T71.162A ASPHYXIATION DUE TO HANGING, INTENTIONAL SELF-HARM, INITIAL ENCOUNTER: ICD-10-CM

## 2023-08-28 DIAGNOSIS — E78.5 HYPERLIPIDEMIA, UNSPECIFIED: ICD-10-CM

## 2023-08-28 DIAGNOSIS — Z86.59 PERSONAL HISTORY OF OTHER MENTAL AND BEHAVIORAL DISORDERS: ICD-10-CM

## 2023-08-28 DIAGNOSIS — Z86.39 PERSONAL HISTORY OF OTHER ENDOCRINE, NUTRITIONAL AND METABOLIC DISEASE: ICD-10-CM

## 2023-08-28 PROBLEM — E11.9 TYPE 2 DIABETES MELLITUS WITHOUT COMPLICATIONS: Chronic | Status: ACTIVE | Noted: 2023-08-12

## 2023-08-28 PROCEDURE — 99203 OFFICE O/P NEW LOW 30 MIN: CPT

## 2023-08-29 ENCOUNTER — NON-APPOINTMENT (OUTPATIENT)
Age: 58
End: 2023-08-29

## 2023-09-05 PROBLEM — Z86.39 HISTORY OF DIABETES MELLITUS: Status: RESOLVED | Noted: 2023-09-05 | Resolved: 2023-09-05

## 2023-09-05 PROBLEM — F31.9 BIPOLAR DISEASE, CHRONIC: Status: RESOLVED | Noted: 2023-09-05 | Resolved: 2023-09-05

## 2023-09-05 RX ORDER — ATORVASTATIN CALCIUM 80 MG/1
TABLET, FILM COATED ORAL
Refills: 0 | Status: ACTIVE | COMMUNITY

## 2023-09-05 RX ORDER — METFORMIN HYDROCHLORIDE 625 MG/1
TABLET ORAL
Refills: 0 | Status: ACTIVE | COMMUNITY

## 2023-09-05 NOTE — HISTORY OF PRESENT ILLNESS
[postmenopausal] : postmenopausal [N] : Patient denies prior pregnancies [unknown] : Patient is unsure of the date of her LMP [Menarche Age: ____] : age at menarche was [unfilled] [Never active] : never active [FreeTextEntry1] : HPI  57 y/o NEW patient, added on   left noted lump while supine, noted lump today  denies skin changes or nipple discharge  LMP: 46, No PMB   Last pap: 25 years ago; due to pain ; REFUSES pelvic exam today Last MM years normal after multiple imaging and cyst noted; had a biopsy which was negative ([facility])   PMH: DM, Bipoar PSH: Hernia repair w/ mesh umbilical  ALL:  nkda Sochx: social etoh; denies illicit or tobacco OBHx: nulligravid  FAMHX:  denies breast, ovarian, colon, uterine cancer     --------------------------------------------------------------------------------------------------------- ASSESSMENT & PLAN:  57 y/o g0 #new left breast lump at 9 o''clock on the the left -diagnostic left MMG -due for bilateral MMG and US -placed on task list -[facility] (patient refuses pelvic exam today) [TextBox_19] : 2 YEARS AGO  [PapSmeardate] : NEVER  [ColonoscopyDate] : 03/2023 [PGHxTotal] : 0

## 2023-09-05 NOTE — PHYSICAL EXAM
[Chaperone Present] : A chaperone was present in the examining room during all aspects of the physical examination [FreeTextEntry1] : MOLLY [Appropriately responsive] : appropriately responsive [No Acute Distress] : no acute distress [Alert] : alert [No Lymphadenopathy] : no lymphadenopathy [Soft] : soft [Non-tender] : non-tender [Non-distended] : non-distended [FreeTextEntry3] : mobile thyroid, no masses, no nodules [Oriented x3] : oriented x3 [FreeTextEntry6] : left breast 1 cm lesion at 9 o;clock; No cervical or axillary lymphadenopathy. [Examination Of The Breasts] : a normal appearance [Normal] : normal [Breast Mass Right Breast ___cm] : no was mass palpable

## 2023-09-06 ENCOUNTER — NON-APPOINTMENT (OUTPATIENT)
Age: 58
End: 2023-09-06

## 2023-10-24 ENCOUNTER — APPOINTMENT (OUTPATIENT)
Dept: OBGYN | Facility: CLINIC | Age: 58
End: 2023-10-24
Payer: COMMERCIAL

## 2023-10-24 ENCOUNTER — NON-APPOINTMENT (OUTPATIENT)
Age: 58
End: 2023-10-24

## 2023-10-24 VITALS
SYSTOLIC BLOOD PRESSURE: 112 MMHG | HEIGHT: 68 IN | BODY MASS INDEX: 22.34 KG/M2 | DIASTOLIC BLOOD PRESSURE: 60 MMHG | WEIGHT: 147.4 LBS

## 2023-10-24 DIAGNOSIS — Z01.419 ENCOUNTER FOR GYNECOLOGICAL EXAMINATION (GENERAL) (ROUTINE) W/OUT ABNORMAL FINDINGS: ICD-10-CM

## 2023-10-24 DIAGNOSIS — Z12.4 ENCOUNTER FOR SCREENING FOR MALIGNANT NEOPLASM OF CERVIX: ICD-10-CM

## 2023-10-24 DIAGNOSIS — N95.2 POSTMENOPAUSAL ATROPHIC VAGINITIS: ICD-10-CM

## 2023-10-24 DIAGNOSIS — Z12.39 ENCOUNTER FOR OTHER SCREENING FOR MALIGNANT NEOPLASM OF BREAST: ICD-10-CM

## 2023-10-24 DIAGNOSIS — Z11.51 ENCOUNTER FOR SCREENING FOR HUMAN PAPILLOMAVIRUS (HPV): ICD-10-CM

## 2023-10-24 DIAGNOSIS — N94.2 VAGINISMUS: ICD-10-CM

## 2023-10-24 PROCEDURE — 99213 OFFICE O/P EST LOW 20 MIN: CPT | Mod: 25

## 2023-10-24 PROCEDURE — 99396 PREV VISIT EST AGE 40-64: CPT

## 2023-10-26 LAB — HPV HIGH+LOW RISK DNA PNL CVX: NOT DETECTED

## 2023-10-28 PROBLEM — N94.2 VAGINISMUS: Status: ACTIVE | Noted: 2023-10-28

## 2023-10-28 PROBLEM — N95.2 VAGINAL ATROPHY: Status: ACTIVE | Noted: 2023-10-28

## 2023-10-30 LAB — CYTOLOGY CVX/VAG DOC THIN PREP: ABNORMAL

## 2023-11-08 ENCOUNTER — APPOINTMENT (OUTPATIENT)
Dept: BREAST CENTER | Facility: CLINIC | Age: 58
End: 2023-11-08
Payer: COMMERCIAL

## 2023-11-08 VITALS
BODY MASS INDEX: 21.82 KG/M2 | SYSTOLIC BLOOD PRESSURE: 124 MMHG | HEART RATE: 73 BPM | WEIGHT: 144 LBS | DIASTOLIC BLOOD PRESSURE: 80 MMHG | HEIGHT: 68 IN

## 2023-11-08 DIAGNOSIS — Z80.1 FAMILY HISTORY OF MALIGNANT NEOPLASM OF TRACHEA, BRONCHUS AND LUNG: ICD-10-CM

## 2023-11-08 DIAGNOSIS — Z83.3 FAMILY HISTORY OF DIABETES MELLITUS: ICD-10-CM

## 2023-11-08 DIAGNOSIS — N63.25 UNSP LUMP IN THE LT BREAST, OVERLAPPING QUADRANTS: ICD-10-CM

## 2023-11-08 DIAGNOSIS — Z78.9 OTHER SPECIFIED HEALTH STATUS: ICD-10-CM

## 2023-11-08 PROCEDURE — 99203 OFFICE O/P NEW LOW 30 MIN: CPT

## 2023-11-08 RX ORDER — EMPAGLIFLOZIN 25 MG/1
25 TABLET, FILM COATED ORAL
Refills: 0 | Status: ACTIVE | COMMUNITY

## 2023-11-08 RX ORDER — LORAZEPAM 1 MG/1
1 TABLET ORAL
Refills: 0 | Status: ACTIVE | COMMUNITY

## 2023-11-08 RX ORDER — TRAZODONE HYDROCHLORIDE 100 MG/1
100 TABLET ORAL
Refills: 0 | Status: ACTIVE | COMMUNITY

## 2023-11-08 RX ORDER — RISPERIDONE 3 MG/1
3 TABLET ORAL
Refills: 0 | Status: ACTIVE | COMMUNITY

## 2023-11-08 RX ORDER — DIVALPROEX SODIUM 500 MG/1
500 TABLET, DELAYED RELEASE ORAL
Refills: 0 | Status: ACTIVE | COMMUNITY

## 2023-12-04 ENCOUNTER — APPOINTMENT (OUTPATIENT)
Dept: ANTEPARTUM | Facility: CLINIC | Age: 58
End: 2023-12-04
Payer: COMMERCIAL

## 2023-12-04 ENCOUNTER — ASOB RESULT (OUTPATIENT)
Age: 58
End: 2023-12-04

## 2023-12-04 ENCOUNTER — APPOINTMENT (OUTPATIENT)
Dept: OBGYN | Facility: CLINIC | Age: 58
End: 2023-12-04
Payer: COMMERCIAL

## 2023-12-04 VITALS
WEIGHT: 153 LBS | SYSTOLIC BLOOD PRESSURE: 118 MMHG | BODY MASS INDEX: 23.19 KG/M2 | HEIGHT: 68 IN | DIASTOLIC BLOOD PRESSURE: 72 MMHG

## 2023-12-04 DIAGNOSIS — D21.9 BENIGN NEOPLASM OF CONNECTIVE AND OTHER SOFT TISSUE, UNSPECIFIED: ICD-10-CM

## 2023-12-04 PROCEDURE — 76856 US EXAM PELVIC COMPLETE: CPT

## 2023-12-04 PROCEDURE — 99213 OFFICE O/P EST LOW 20 MIN: CPT

## 2023-12-31 PROBLEM — D21.9 FIBROIDS: Status: ACTIVE | Noted: 2023-10-24

## 2023-12-31 NOTE — HISTORY OF PRESENT ILLNESS
[Mammogramdate] : 8/29/23 [TextBox_19] : BR1 [BreastSonogramDate] : 8/29/23 [TextBox_25] : BR1 [PapSmeardate] : 10/24/23 [TextBox_31] : NEG [ColonoscopyDate] : 03/2023 [HPVDate] : 10/24/23 [TextBox_78] : NEG [LMPDate] : 2011 [PGHxTotal] : 0 [FreeTextEntry1] : 2011

## 2024-03-04 ENCOUNTER — APPOINTMENT (OUTPATIENT)
Dept: ANTEPARTUM | Facility: CLINIC | Age: 59
End: 2024-03-04

## 2024-03-04 ENCOUNTER — APPOINTMENT (OUTPATIENT)
Dept: OBGYN | Facility: CLINIC | Age: 59
End: 2024-03-04